# Patient Record
Sex: FEMALE | Race: WHITE | Employment: FULL TIME | URBAN - METROPOLITAN AREA
[De-identification: names, ages, dates, MRNs, and addresses within clinical notes are randomized per-mention and may not be internally consistent; named-entity substitution may affect disease eponyms.]

---

## 2017-11-24 LAB
ABO + RH BLD: NORMAL
ABO + RH BLD: NORMAL
BLD GP AB SCN SERPL QL: NORMAL
HBV SURFACE AG SERPL QL IA: NORMAL
HIV 1+2 AB+HIV1 P24 AG SERPL QL IA: NORMAL
RUBELLA ABY IGG: 4.16
RUBELLA ANTIBODY IGG QUANTITATIVE: NORMAL IU/ML
T PALLIDUM IGG SER QL: NORMAL

## 2018-02-09 ENCOUNTER — PRE VISIT (OUTPATIENT)
Dept: MATERNAL FETAL MEDICINE | Facility: CLINIC | Age: 33
End: 2018-02-09

## 2018-02-19 ENCOUNTER — OFFICE VISIT (OUTPATIENT)
Dept: MATERNAL FETAL MEDICINE | Facility: CLINIC | Age: 33
End: 2018-02-19
Attending: OBSTETRICS & GYNECOLOGY
Payer: COMMERCIAL

## 2018-02-19 ENCOUNTER — HOSPITAL ENCOUNTER (OUTPATIENT)
Dept: ULTRASOUND IMAGING | Facility: CLINIC | Age: 33
Discharge: HOME | End: 2018-02-19
Attending: OBSTETRICS & GYNECOLOGY | Admitting: OBSTETRICS & GYNECOLOGY
Payer: COMMERCIAL

## 2018-02-19 DIAGNOSIS — O09.812 PREGNANCY RESULTING FROM ASSISTED REPRODUCTIVE TECHNOLOGY IN SECOND TRIMESTER: Primary | ICD-10-CM

## 2018-02-19 DIAGNOSIS — O26.90 PREGNANCY RELATED CONDITION, ANTEPARTUM: ICD-10-CM

## 2018-02-19 DIAGNOSIS — O35.9XX0 SUSPECTED FETAL ANOMALY, ANTEPARTUM, SINGLE OR UNSPECIFIED FETUS: ICD-10-CM

## 2018-02-19 PROCEDURE — 93325 DOPPLER ECHO COLOR FLOW MAPG: CPT | Mod: 52

## 2018-02-19 NOTE — MR AVS SNAPSHOT
"              After Visit Summary   2/19/2018    Imelda Zepeda    MRN: [id number]           Patient Information     Date Of Birth          1985        Visit Information        Provider Department      2/19/2018 3:30 PM Karina Ward MD [facility] Maternal Fetal Medicine [facility]        Today's Diagnoses     Pregnancy resulting from assisted reproductive technology in second trimester    -  1    Suspected fetal anomaly, antepartum, single or unspecified fetus           Follow-ups after your visit        Future tests that were ordered for you today     Open Future Orders        Priority Expected Expires Ordered    MFM US Comprehensive Single Routine  12/19/2018 2/19/2018    Echo Fetal Complete-Peds Cardiology Routine  2/19/2019 2/19/2018    Maternal Fetal Unable to Finish Exam single fetus Routine  10/19/2018 12/19/2017            Who to contact     If you have questions or need follow up information about today's clinic visit or your schedule please contact [facility] MATERNAL FETAL MEDICINE [facility] directly at [phone].  Normal or non-critical lab and imaging results will be communicated to you by Primitive Makeuphart, letter or phone within 4 business days after the clinic has received the results. If you do not hear from us within 7 days, please contact the clinic through [employer] or phone. If you have a critical or abnormal lab result, we will notify you by phone as soon as possible.  Submit refill requests through eDreams Ed[employer]oft or call your pharmacy and they will forward the refill request to us. Please allow 3 business days for your refill to be completed.          Additional Information About Your Visit        [employer] Information     eDreams Ed[employer]oft lets you send messages to your doctor, view your test results, renew your prescriptions, schedule appointments and more. To sign up, go to [email or web address] . Click on \"Log in\" on the left side of the screen, which will take you to the Welcome page. Then click on " "\"Sign up Now\" on the right side of the page.     You will be asked to enter the access code listed below, as well as some personal information. Please follow the directions to create your username and password.     Your access code is: [email or web address]  Expires: 2018  9:24 PM     Your access code will  in 90 days. If you need help or a new code, please call your [place] clinic or [phone].        Care EveryWhere ID     This is your Care EveryWhere ID. This could be used by other organizations to access your [place] medical records  [id number]         Blood Pressure from Last 3 Encounters:   10/19/14 125/75    Weight from Last 3 Encounters:   No data found for Wt               Primary Care Provider Office Phone # Fax #    Leatha Parish DO Carmelina [phone]       [facility] [address] [place] [address]        Equal Access to Services     HANK RAMOS AH: Hadii aad ku hadasho Soomaali, waaxda luqadaha, qaybta kaalmada adeegyada, waxay idiin hayingrisn shira kharatelma mccann . So [facility] [phone].    ATENCIÓN: Si habla español, tiene a quevedo disposición servicios gratuitos de asistencia lingüística. Llame al [phone].    We comply with applicable federal civil rights laws and [place] laws. We do not discriminate on the basis of race, color, national origin, age, disability, sex, sexual orientation, or gender identity.            Thank you!     Thank you for choosing [facility]  for your care. Our goal is always to provide you with excellent care. Hearing back from our patients is one way we can continue to improve our services. Please take a few minutes to complete the written survey that you may receive in the mail after your visit with us. Thank you!             Your Updated Medication List - Protect others around you: Learn how to safely use, store and throw away your medicines at www.disposemymeds.org.          This list is accurate as of " 2/19/18  9:24 PM.  Always use your most recent med list.                   Brand Name Dispense Instructions for use Diagnosis    prenatal multivitamin plus iron 27-0.8 MG Tabs per tablet      Take 1 tablet by mouth daily

## 2018-02-23 DIAGNOSIS — O35.9XX0 SUSPECTED FETAL ANOMALY, ANTEPARTUM, SINGLE OR UNSPECIFIED FETUS: Primary | ICD-10-CM

## 2018-02-27 ENCOUNTER — OFFICE VISIT (OUTPATIENT)
Dept: MATERNAL FETAL MEDICINE | Facility: CLINIC | Age: 33
End: 2018-02-27
Attending: OBSTETRICS & GYNECOLOGY
Payer: COMMERCIAL

## 2018-02-27 ENCOUNTER — HOSPITAL ENCOUNTER (OUTPATIENT)
Dept: CARDIOLOGY | Facility: CLINIC | Age: 33
Discharge: HOME | End: 2018-02-27
Attending: OBSTETRICS & GYNECOLOGY | Admitting: OBSTETRICS & GYNECOLOGY
Payer: COMMERCIAL

## 2018-02-27 ENCOUNTER — HOSPITAL ENCOUNTER (OUTPATIENT)
Dept: ULTRASOUND IMAGING | Facility: CLINIC | Age: 33
End: 2018-02-27
Attending: OBSTETRICS & GYNECOLOGY
Payer: COMMERCIAL

## 2018-02-27 DIAGNOSIS — O35.9XX0 SUSPECTED FETAL ANOMALY, ANTEPARTUM, SINGLE OR UNSPECIFIED FETUS: ICD-10-CM

## 2018-02-27 DIAGNOSIS — O35.9XX0 SUSPECTED FETAL ANOMALY, ANTEPARTUM, SINGLE OR UNSPECIFIED FETUS: Primary | ICD-10-CM

## 2018-02-27 DIAGNOSIS — O35.BXX0 ANOMALY OF HEART OF FETUS AFFECTING PREGNANCY, ANTEPARTUM, SINGLE OR UNSPECIFIED FETUS: Primary | ICD-10-CM

## 2018-02-27 PROCEDURE — 76811 OB US DETAILED SNGL FETUS: CPT

## 2018-02-27 PROCEDURE — 76825 ECHO EXAM OF FETAL HEART: CPT

## 2018-02-27 PROCEDURE — 40000072 ZZH STATISTIC GENETIC COUNSELING, < 16 MIN: Mod: ZF | Performed by: GENETIC COUNSELOR, MS

## 2018-02-27 NOTE — MR AVS SNAPSHOT
After Visit Summary   2/27/2018    Imelda Zepeda    MRN: [id number]           Patient Information     Date Of Birth          1985        Visit Information        Provider Department      2/27/2018 3:30 PM Radha Calderon DO [facility] Maternal Fetal Medicine - [place]        Today's Diagnoses     Anomaly of heart of fetus affecting pregnancy, antepartum, single or unspecified fetus    -  1       Follow-ups after your visit        Your next 10 appointments already scheduled     Mar 09, 2018 10:15 AM CST   (Arrive by 10:00 AM)   MFM US COMPRE SINGLE F/U with [id number]   [facility] Maternal Fetal Medicine Ultrasound - [facility])    [address]  [facility] [address] [phone]           Wear comfortable clothes and leave your valuables at home.            Mar 09, 2018 10:45 AM CST   Radiology MD with  ISRRAEL MORALES   [facility] Maternal Fetal Medicine - [facility])    [address]  [facility] [address] [phone]           Please arrive at the time given for your first appointment. This visit is used internally to schedule the physician's time during your ultrasound.            Mar 16, 2018 10:15 AM CDT   (Arrive by 10:00 AM)   MFM US COMPRE SINGLE F/U with RHMFMUSR3   [facility] Maternal Fetal Medicine Ultrasound - [facility])    [address]  [facility] [id number] [phone]           Wear comfortable clothes and leave your valuables at home.            Mar 16, 2018 10:45 AM CDT   [facility]   [facility] Maternal Fetal Medicine [facility])    [address]  [facility] [address]   [phone]           Please arrive at the time given for your first appointment. This visit is used internally to schedule the physician's time during your ultrasound.            Mar 30, 2018  1:00 PM CDT   Ech Fetal Follow-Up* with  "URFETR1   [facility] Echo/EKG ([facility])    [address]  [place] [address]                 Future tests that were ordered for you today     Open Future Orders        Priority Expected Expires Ordered    [facility] Comprehensive Single F/U Routine 3/13/2018 2019 2018    MF US Comprehensive Single F/U Routine 3/27/2018 2019 2018    Echo fetal complete follow up Routine  2019            Who to contact     If you have questions or need follow up information about today's clinic visit or your schedule please contact [facility] MATERNAL FETAL MEDICINE [facility] directly at [phone].  Normal or non-critical lab and imaging results will be communicated to you by Cell Therapyhart, letter or phone within 4 business days after the clinic has received the results. If you do not hear from us within 7 days, please contact the clinic through Cell Therapyhart or phone. If you have a critical or abnormal lab result, we will notify you by phone as soon as possible.  Submit refill requests through [employer] or call your pharmacy and they will forward the refill request to us. Please allow 3 business days for your refill to be completed.          Additional Information About Your Visit        Cell [employer]" Information     [employer] lets you send messages to your doctor, view your test results, renew your prescriptions, schedule appointments and more. To sign up, go to [email or web address] . Click on \"Log in\" on the left side of the screen, which will take you to the Welcome page. Then click on \"Sign up Now\" on the right side of the page.     You will be asked to enter the access code listed below, as well as some personal information. Please follow the directions to create your username and password.     Your access code is: [email or web address]  Expires: 2018  9:24 PM     Your access code will  in 90 days. If you need help or a new code, please call your [place] clinic or " [phone].        Care EveryWhere ID     This is your Care EveryWhere ID. This could be used by other organizations to access your [place] medical records  [id number]         Blood Pressure from Last 3 Encounters:   10/19/14 125/75    Weight from Last 3 Encounters:   No data found for Wt               Primary Care Provider Office Phone # Fax #    Leatha Cosme [phone]       [facility] [address] [place] [address]        Equal Access to Services     HANK RAMOS : Hadii aad ku hadasho Soomaali, waaxda luqadaha, qaybta kaalmada adeegyada, waxay idiin hayaan adeeg kharatelma latatum . So [facility] [phone].    ATENCIÓN: Si habla español, tiene a quevedo disposición servicios gratuitos de asistencia lingüística. Llame al [phone].    We comply with applicable federal civil rights laws and [place] laws. We do not discriminate on the basis of race, color, national origin, age, disability, sex, sexual orientation, or gender identity.            Thank you!     Thank you for choosing [facility]  for your care. Our goal is always to provide you with excellent care. Hearing back from our patients is one way we can continue to improve our services. Please take a few minutes to complete the written survey that you may receive in the mail after your visit with us. Thank you!             Your Updated Medication List - Protect others around you: Learn how to safely use, store and throw away your medicines at www.disposemymeds.org.          This list is accurate as of 2/27/18  5:21 PM.  Always use your most recent med list.                   Brand Name Dispense Instructions for use Diagnosis    prenatal multivitamin plus iron 27-0.8 MG Tabs per tablet      Take 1 tablet by mouth daily

## 2018-02-27 NOTE — PROGRESS NOTES
[facility]  Genetic Counseling Consult    Patient: Imelda Zepeda YOB: 1985   Date of Service: 2/27/18      Imelda Zepeda was seen at [facility] [place] for genetic consultation to discuss the ultrasound finding observed on her fetal echocardiogram and comprehensive ultrasound.  Imelda was accompanied to her appointments today by her  Zach.  Our conversation today was kept brief as the couple reported feeling slightly overwhelmed and wanting time to digest the information they have already received.         Impression/Plan:   1.  Josiah was referred to [facility] initially for a fetal echocardiogram with an indication of IVF pregnancy.  This initial imaging study identified a cardiac lesion, and Imelda returned for care to day for a follow up echocardiogram and comprehensive ultrasound.  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  Please see corresponding documentation for full details.      2.  Imelda met with genetic counseling at the conclusion of her appointments today to discuss possible genetic causes for the benign mass, as well as available testing options.  We briefly discussed two specific conditions, Tuberous Sclerosis and Neurofibromatosis.      3.  Imelda's pregnancy was conceived via IVF with pre-implantation genetic screening, which greatly reduces the risks for her pregnancy to be affected with common chromosomes such as Down syndrome, Trisomy 18, and Trisomy 13, PGS can also screen for large scale additions and deletions of chromosomes, but it does not assess for single gene disorders such as tuberous sclerosis or neurofibromatosis.  Imelda has had no additional screening during her pregnancy.     4.  Imelda will return in 4 weeks for a repeat echocardiogram and comprehensive ultrasound, and genetic counseling remains available at any point in time for additional discussion.             Risk Assessment for Chromosome Conditions:     We explained that the risk for fetal chromosome abnormalities affecting Imelda's pregnancy is low, considering the preimplantation genetic screening that she has already had.  However, PGS is not definitive testing and does not rule out the possibility of her pregnancy having a chromosome abnormality.  Additional testing options such as NIPT or amniocentesis are available for additional information.         Discussion     Neurofibromatosis    Neurofibromatosis is a genetic condition that causes a wide range of skin findings, including cafe-au-lait spots, freckling in the armpits and groin, and tumors called neurofibromas.  About half of individuals with NF have learning disabilities.  Some individuals with NF can have neurofibromas develop in and around the heart.  The exact risk association between a cardiac fibroma detected prenatally and having neurofibromatosis is unclear.  Today we discussed that NF is an extremely variable condition, and that although all individuals who have a genetic mutation will have some symptoms, there are individuals who are never diagnosed because the symptoms are mild and never require medical management.  In 50% of cases, the genetic change arises as a new mutation in the affected individual, and is not inherited from either parent.  There are strict criteria for establishing a clinical diagnosis of NF, and this requires a thorough physical evaluation that cannot be completed prenatally.  We discussed that there is genetic testing for neurofibromatosis, with a detection rate in individuals who meet clinical diagnostic criteria of 95%.  This means that in individuals who are known to be affected with NF based on their presenting symptoms, genetic testing will find a mutation 95% of the time.      Tuberous Sclerosis Complex     Tuberous sclerosis complex is a genetic condition that impacts the skin, brain, heart, kidneys and lungs,  although how it presents varies from individual to individual, with some individuals being mildly affected enough that they are never diagnosed, and some individuals being much more severely affected.  In 66% of cases of TSC, the genetic cause arises and a new mutation in the affected individual, and is not inherited from either parent.  Rhabdomyomas are benign tumors that are commonly seen in individuals with TSC.  Approximately 75-80% of individuals with a rhabdomyoma present at birth are affected with TSC.  There are strict criteria for establishing a clinical diagnosis of TSC, and this requires a thorough physical evaluation that cannot be completed prenatally.  We discussed that there is genetic testing for tuberous sclerosis complex, with a detection rate in individuals who meet clinical diagnostic criteria of 75-90%.  This means that in individuals who are known to be affected with NF based on their presenting symptoms, genetic testing will find a mutation 75-90% of the time.      Our discussion today focused primarily on a basic overview of the two conditions that we would say the pregnancy is at increased risk for based on the ultrasound finding.  Because we are unable to definitively classify the lesion as a fibroma or a rhabdomyoma, there is a risk for the pregnancy to be affected with either of these conditions.  We discussed that the genetic testing that the couple had on their embryos as a part of the IVF process would not have looked for these conditions, unless there was a known risk and known genetic mutation in the family.    We discussed the utility of genetic testing via amniocentesis during pregnancy.  For both of these conditions, even when an individual meets clinical diagnostic criteria, genetic testing may not identify the genetic cause.  Clinical diagnoses of these conditions traditionally requires a physical examination because of the prevalence in skin findings in both conditions; this  means that a clinical diagnosis cannot be established prenatally, except with genetic testing that identifies a causative mutation.  A negative result on amniocentesis would not rule out either of these conditions, but it would make them less likely.  Further evaluation by a  familiar with NF and TSC would be recommended after delivery even in the result of negative genetic testing.  We discussed that for both of these conditions, not all symptoms may be present at birth, but that early evaluation would be a way to provide more definitive information.      At this time, Imelda and Zach were uncertain whether or not genetic testing via amniocentesis would be beneficial to them.  Amniocentesis is available at any point in time throughout pregnancy.  I encouraged the couple to take time to digest everything they had heard so far, and to write down questions as they come up.  Imelda and Zach were encouraged to contact myself or any of the other providers involved in their care with any questions or concerns, and a more extensive genetic counseling appointment could be arranged at their convenience as well.      It was a pleasure to be involved with Imelda s care. Face-to-face time of the meeting was 15 minutes.      Madi Hernandes MS, [facility]  Licensed Genetic Counselor  Phone: [phone]  Pager: [phone]

## 2018-02-27 NOTE — MR AVS SNAPSHOT
After Visit Summary   2/27/2018    Imelda Zepeda    MRN: [id number]           Patient Information     Date Of Birth          1985        Visit Information        Provider Department      2/27/2018 12:45 PM Madi Hernandes GC [facility] Maternal Fetal Medicine [facility]        Today's Diagnoses     Suspected fetal anomaly, antepartum, single or unspecified fetus    -  1       Follow-ups after your visit        Your next 10 appointments already scheduled     Mar 09, 2018 10:15 AM CST   (Arrive by 10:00 AM)   MFM US COMPRE SINGLE F/U with [id number]   [facility] Maternal Fetal Medicine Ultrasound - [facility])    [address]  [facility] [address] [phone]           Wear comfortable clothes and leave your valuables at home.            Mar 09, 2018 10:45 AM CST   Radiology MD with  ISRRAEL MORALES   [facility] Maternal Fetal Medicine [facility])    [address]  [facility] [address] [phone]           Please arrive at the time given for your first appointment. This visit is used internally to schedule the physician's time during your ultrasound.            Mar 16, 2018 10:15 AM CDT   (Arrive by 10:00 AM)   MFM US COMPRE SINGLE F/U with [id number]   [facility] Maternal Fetal Medicine Ultrasound - [facility])    [address]  [facility] [id number] [phone]           Wear comfortable clothes and leave your valuables at home.            Mar 16, 2018 10:45 AM CDT   Radiology MD with [facility]   [facility] Maternal Fetal Medicine [facility])    [address]  [facility] [address]   [phone]           Please arrive at the time given for your first appointment. This visit is used internally to schedule the physician's time during your ultrasound.            Mar 30, 2018  1:00 PM CDT   Ech Fetal Follow-Up* with URFETR1   [facility]  [facility]cho[facility] ([facility])    [address]  [place] [address]                 Future tests that were ordered for you today     Open Future Orders        Priority Expected Expires Ordered    [facility] Comprehensive Single F/U Routine 3/13/2018 2019 2018    MF US Comprehensive Single F/U Routine 3/27/2018 2019 2018    Echo fetal complete follow up Routine  2019            Who to contact     If you have questions or need follow up information about today's clinic visit or your schedule please contact [facility] MATERNAL FETAL MEDICINE [facility] directly at [phone].  Normal or non-critical lab and imaging results will be communicated to you by FlyCasthart, letter or phone within 4 business days after the clinic has received the results. If you do not hear from us within 7 days, please contact the clinic through [employer]Cas[employer] or phone. If you have a critical or abnormal lab result, we will notify you by phone as soon as possible.  Submit refill requests through [employer] or call your pharmacy and they will forward the refill request to us. Please allow 3 business days for your refill to be completed.          Additional Information About Your Visit        [employer] Information     [employer]R lets you send messages to your doctor, view your test results, renew your prescriptions, schedule appointments and more. To sign up, go to [email or web address] . Click on \"Log in\" on the left side of the screen, which will take you to the Welcome page. Then click on \"Sign up Now\" on the right side of the page.     You will be asked to enter the access code listed below, as well as some personal information. Please follow the directions to create your username and password.     Your access code is: [email or web address]  Expires: 2018  9:24 PM     Your access code will  in 90 days. If you need help or a new code, please call your [place] clinic or [phone].        Care " EveryWhere ID     This is your Care EveryWhere ID. This could be used by other organizations to access your [place] medical records  [id number]         Blood Pressure from Last 3 Encounters:   10/19/14 125/75    Weight from Last 3 Encounters:   No data found for Wt              We Performed the Following     [facility] Genetic Counseling        Primary Care Provider Office Phone # Fax #    Leatha Cosme [phone]       [facility] [address] [place] [address]        Equal Access to Services     HANK RAMOS : Hadii aad ku hadasho Soomaali, waaxda luqadaha, qaybta kaalmada adeegyada, waxay idiin hayaan adeeg kharash latatum . So [facility] [phone].    ATENCIÓN: Si habla español, tiene a quevedo disposición servicios gratuitos de asistencia lingüística. Llame al [phone].    We comply with applicable federal civil rights laws and [place] laws. We do not discriminate on the basis of race, color, national origin, age, disability, sex, sexual orientation, or gender identity.            Thank you!     Thank you for choosing [facility]  for your care. Our goal is always to provide you with excellent care. Hearing back from our patients is one way we can continue to improve our services. Please take a few minutes to complete the written survey that you may receive in the mail after your visit with us. Thank you!             Your Updated Medication List - Protect others around you: Learn how to safely use, store and throw away your medicines at www.disposemymeds.org.          This list is accurate as of 2/27/18  5:30 PM.  Always use your most recent med list.                   Brand Name Dispense Instructions for use Diagnosis    prenatal multivitamin plus iron 27-0.8 MG Tabs per tablet      Take 1 tablet by mouth daily

## 2018-02-27 NOTE — PROGRESS NOTES
"Please see \"Imaging\" tab under \"Chart Review\" for details of today's US.      Radha Calderon, DO  Maternal-Fetal Medicine        "

## 2018-03-09 ENCOUNTER — OFFICE VISIT (OUTPATIENT)
Dept: MATERNAL FETAL MEDICINE | Facility: CLINIC | Age: 33
End: 2018-03-09
Attending: OBSTETRICS & GYNECOLOGY
Payer: COMMERCIAL

## 2018-03-09 ENCOUNTER — HOSPITAL ENCOUNTER (OUTPATIENT)
Dept: ULTRASOUND IMAGING | Facility: CLINIC | Age: 33
Discharge: HOME | End: 2018-03-09
Attending: OBSTETRICS & GYNECOLOGY | Admitting: OBSTETRICS & GYNECOLOGY
Payer: COMMERCIAL

## 2018-03-09 DIAGNOSIS — O35.BXX0 ANOMALY OF HEART OF FETUS AFFECTING PREGNANCY, ANTEPARTUM, SINGLE OR UNSPECIFIED FETUS: ICD-10-CM

## 2018-03-09 DIAGNOSIS — O35.BXX0 ANOMALY OF HEART OF FETUS AFFECTING PREGNANCY, ANTEPARTUM, SINGLE OR UNSPECIFIED FETUS: Primary | ICD-10-CM

## 2018-03-09 PROCEDURE — 76816 OB US FOLLOW-UP PER FETUS: CPT

## 2018-03-09 NOTE — MR AVS SNAPSHOT
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            Mar 16, 2018 10:45 AM CDT   Radiology MD with  ISRRAEL MORALES   [facility] Maternal Fetal Medicine [facility] ([facility])    [address]  [facility] [address] [phone]           Please arrive at the time given for your first appointment. This visit is used internally to schedule the physician's time during your ultrasound.            Mar 30, 2018  1:00 PM CDT   Ech Fetal Follow-Up* with URFETR1   [facility] Echo/EKG ([facility])    [address] [facility] [address]                 Who to contact     If you have questions or need follow up information about today's clinic visit or your schedule please contact [facility] MATERNAL FETAL [facility] ([place], [place]) directly at [phone].  Normal or non-critical lab and imaging results will be communicated to you by MyChart, letter or phone within 4 business days after the clinic has received the results. If you do not hear from us within 7 days, please contact the clinic through MyChart or phone. If you have a critical or abnormal lab result, we will notify you by phone as soon as possible.  Submit refill  "requests through [facility] or call your pharmacy and they will forward the refill request to us. Please allow 3 business days for your refill to be completed.          Additional Information About Your Visit        [employer] Information     [facility] lets you send messages to your doctor, view your test results, renew your prescriptions, schedule appointments and more. To sign up, go to [email or web address] . Click on \"Log in\" on the left side of the screen, which will take you to the Welcome page. Then click on \"Sign up Now\" on the right side of the page.     You will be asked to enter the access code listed below, as well as some personal information. Please follow the directions to create your username and password.     Your access code is: [email or web address]  Expires: 2018 10:24 PM     Your access code will  in 90 days. If you need help or a new code, please call your [place] clinic or [phone].        Care EveryWhere ID     This is your Care EveryWhere ID. This could be used by other organizations to access your [place] medical records  [id number]         Blood Pressure from Last 3 Encounters:   10/19/14 125/75    Weight from Last 3 Encounters:   No data found for Wt              Today, you had the following     No orders found for display       Primary Care Provider Office Phone # Fax #    Leatha Cosme [phone]       [facility] [address] [place] [address]        Equal Access to Services     JUDITH RAMOS : Hadii aad ku hadasho Soomaali, waaxda luqadaha, qaybta kaalmada adeegyada, lacy alvarado adeshekhar mccann . So [facility] [phone].    ATENCIÓN: Si habla español, tiene a quevedo disposición servicios gratuitos de asistencia lingüística. Carmen al [phone].    We comply with applicable federal civil rights laws and [place] laws. We do not discriminate on the basis of race, color, national origin, age, disability, sex, sexual orientation, or " gender identity.            Thank you!     Thank you for choosing [facility]  for your care. Our goal is always to provide you with excellent care. Hearing back from our patients is one way we can continue to improve our services. Please take a few minutes to complete the written survey that you may receive in the mail after your visit with us. Thank you!             Your Updated Medication List - Protect others around you: Learn how to safely use, store and throw away your medicines at www.disposemymeds.org.          This list is accurate as of 3/9/18 11:59 PM.  Always use your most recent med list.                   Brand Name Dispense Instructions for use Diagnosis    prenatal multivitamin plus iron 27-0.8 MG Tabs per tablet      Take 1 tablet by mouth daily

## 2018-03-16 ENCOUNTER — HOSPITAL ENCOUNTER (OUTPATIENT)
Dept: ULTRASOUND IMAGING | Facility: CLINIC | Age: 33
Discharge: HOME | End: 2018-03-16
Attending: OBSTETRICS & GYNECOLOGY | Admitting: OBSTETRICS & GYNECOLOGY
Payer: COMMERCIAL

## 2018-03-16 ENCOUNTER — OFFICE VISIT (OUTPATIENT)
Dept: MATERNAL FETAL MEDICINE | Facility: CLINIC | Age: 33
End: 2018-03-16
Attending: OBSTETRICS & GYNECOLOGY
Payer: COMMERCIAL

## 2018-03-16 DIAGNOSIS — O35.BXX0 ANOMALY OF HEART OF FETUS AFFECTING PREGNANCY, ANTEPARTUM, SINGLE OR UNSPECIFIED FETUS: ICD-10-CM

## 2018-03-16 DIAGNOSIS — O35.BXX0 ANOMALY OF HEART OF FETUS AFFECTING PREGNANCY, ANTEPARTUM, SINGLE OR UNSPECIFIED FETUS: Primary | ICD-10-CM

## 2018-03-16 PROCEDURE — 76816 OB US FOLLOW-UP PER FETUS: CPT

## 2018-03-16 NOTE — PROGRESS NOTES
"Please see \"Imaging\" tab under \"Chart Review\" for details of today's US at the [facility].    Roger Del Valle MD  Maternal-Fetal Medicine    "

## 2018-03-16 NOTE — MR AVS SNAPSHOT
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            Mar 30, 2018 12:15 PM CDT   Radiology MD with UR ISRRAEL MORALES   [facility] Maternal Fetal Medicine - [facility])    [address]  [facility] [address] [phone]           Please arrive at the time given for your first appointment. This visit is used internally to schedule the physician's time during your ultrasound.            Mar 30, 2018  1:00 PM CDT   Ech Fetal Follow-Up* with URFETR1   [facility] Echo[facility]EKG ([facility])    [address] [place] [address]  [facility] [address]                 Future tests that were ordered for you today     Open Future Orders        Priority Expected Expires Ordered    [facility] US Comprehensive Single F/U Routine 3/30/2018 3/16/2019 3/16/2018            Who to contact     If you have questions or need follow up information about today's clinic visit or your schedule please contact [facility] MATERNAL FETAL MEDICINE [facility] directly at [phone].  Normal or non-critical lab and imaging results will be communicated to you by MyChart, letter or phone within 4 business days after the clinic has  "received the results. If you do not hear from us within 7 days, please contact the clinic through [employer] or phone. If you have a critical or abnormal lab result, we will notify you by phone as soon as possible.  Submit refill requests through [employer] or call your pharmacy and they will forward the refill request to us. Please allow 3 business days for your refill to be completed.          Additional Information About Your Visit        [employer] Information     [employer] lets you send messages to your doctor, view your test results, renew your prescriptions, schedule appointments and more. To sign up, go to [email or web address] . Click on \"Log in\" on the left side of the screen, which will take you to the Welcome page. Then click on \"Sign up Now\" on the right side of the page.     You will be asked to enter the access code listed below, as well as some personal information. Please follow the directions to create your username and password.     Your access code is: [email or web address]  Expires: 2018 10:24 PM     Your access code will  in 90 days. If you need help or a new code, please call your [place] clinic or [phone].        Care EveryWhere ID     This is your Care EveryWhere ID. This could be used by other organizations to access your [place] medical records  [id number]         Blood Pressure from Last 3 Encounters:   10/19/14 125/75    Weight from Last 3 Encounters:   No data found for                Primary Care Provider Office Phone # Fax #    Leatha Марина Cosme [phone]       [facility] [address] [place] [address]        Equal Access to Services     [facility]: Hadii aad ku hadasho Soomaali, waaxda luqadaha, qaybta kaalmada damien, lacy javed. So [facility] [phone].    ATENCIÓN: Si habla español, tiene a quevedo disposición servicios gratuitos de asistencia lingüística. Llame al [phone].    We comply with applicable " federal civil rights laws and [place] laws. We do not discriminate on the basis of race, color, national origin, age, disability, sex, sexual orientation, or gender identity.            Thank you!     Thank you for choosing [facility]  for your care. Our goal is always to provide you with excellent care. Hearing back from our patients is one way we can continue to improve our services. Please take a few minutes to complete the written survey that you may receive in the mail after your visit with us. Thank you!             Your Updated Medication List - Protect others around you: Learn how to safely use, store and throw away your medicines at www.disposemymeds.org.          This list is accurate as of 3/16/18 10:58 AM.  Always use your most recent med list.                   Brand Name Dispense Instructions for use Diagnosis    prenatal multivitamin plus iron 27-0.8 MG Tabs per tablet      Take 1 tablet by mouth daily

## 2018-03-30 ENCOUNTER — OFFICE VISIT (OUTPATIENT)
Dept: MATERNAL FETAL MEDICINE | Facility: CLINIC | Age: 33
End: 2018-03-30
Attending: OBSTETRICS & GYNECOLOGY
Payer: COMMERCIAL

## 2018-03-30 ENCOUNTER — HOSPITAL ENCOUNTER (OUTPATIENT)
Dept: CARDIOLOGY | Facility: CLINIC | Age: 33
Discharge: HOME | End: 2018-03-30
Attending: PEDIATRICS | Admitting: OBSTETRICS & GYNECOLOGY
Payer: COMMERCIAL

## 2018-03-30 ENCOUNTER — HOSPITAL ENCOUNTER (OUTPATIENT)
Dept: ULTRASOUND IMAGING | Facility: CLINIC | Age: 33
End: 2018-03-30
Attending: OBSTETRICS & GYNECOLOGY
Payer: COMMERCIAL

## 2018-03-30 DIAGNOSIS — O35.BXX0 ANOMALY OF HEART OF FETUS AFFECTING PREGNANCY, ANTEPARTUM, SINGLE OR UNSPECIFIED FETUS: ICD-10-CM

## 2018-03-30 DIAGNOSIS — D21.9 RHABDOMYOMA: ICD-10-CM

## 2018-03-30 DIAGNOSIS — R93.1 ABNORMAL FINDINGS ON DIAGNOSTIC IMAGING OF HEART AND CORONARY CIRCULATION: Primary | ICD-10-CM

## 2018-03-30 PROCEDURE — 76816 OB US FOLLOW-UP PER FETUS: CPT

## 2018-03-30 PROCEDURE — 93325 DOPPLER ECHO COLOR FLOW MAPG: CPT

## 2018-03-30 NOTE — MR AVS SNAPSHOT
"              After Visit Summary   3/30/2018    Imelda Zepeda    MRN: [id number]           Patient Information     Date Of Birth          1985        Visit Information        Provider Department      3/30/2018 12:15 PM Radha Calderon, DO [facility] Maternal Fetal Medicine [facility]        Today's Diagnoses     Abnormal findings on diagnostic imaging of heart and coronary circulation    -  1       Follow-ups after your visit        Future tests that were ordered for you today     Open Future Orders        Priority Expected Expires Ordered    [facility] Comprehensive Single F/U Routine 4/13/2018 3/30/2019 3/30/2018    [facility] Comprehensive Single F/U Routine  3/30/2019 3/30/2018    [facility] Comprehensive Single F/U Routine  3/30/2019 3/30/2018            Who to contact     If you have questions or need follow up information about today's clinic visit or your schedule please contact [facility] MATERNAL FETAL MEDICINE [facility] directly at [phone].  Normal or non-critical lab and imaging results will be communicated to you by [employer], letter or phone within 4 business days after the clinic has received the results. If you do not hear from us within 7 days, please contact the clinic through [employer] or phone. If you have a critical or abnormal lab result, we will notify you by phone as soon as possible.  Submit refill requests through [employer] or call your pharmacy and they will forward the refill request to us. Please allow 3 business days for your refill to be completed.          Additional Information About Your Visit        [employer] Information     [employer] lets you send messages to your doctor, view your test results, renew your prescriptions, schedule appointments and more. To sign up, go to [email or web address] . Click on \"Log in\" on the left side of the screen, which will take you to the Welcome page. Then click on \"Sign up Now\" on the right side of the page.     You will be asked to enter the access code " listed below, as well as some personal information. Please follow the directions to create your username and password.     Your access code is: [email or web address]  Expires: 2018 10:24 PM     Your access code will  in 90 days. If you need help or a new code, please call your [place] clinic or [phone].        Care EveryWhere ID     This is your Care EveryWhere ID. This could be used by other organizations to access your [place] medical records  [id number]         Blood Pressure from Last 3 Encounters:   10/19/14 125/75    Weight from Last 3 Encounters:   No data found for Wt               Primary Care Provider Office Phone # Fax #    Leatha Parish DO [place] [phone]       [facility] [address] [place] [address]        Equal Access to Services     [facility] : Hadii aad ku hadasho Soomaali, waaxda luqadaha, qaybta kaalmada adeegyada, lacy mccann . So [facility] [phone].    ATENCIÓN: Si habla español, tiene a quevedo disposición servicios gratuitos de asistencia lingüística. Carmen al [phone].    We comply with applicable federal civil rights laws and [place] laws. We do not discriminate on the basis of race, color, national origin, age, disability, sex, sexual orientation, or gender identity.            Thank you!     Thank you for choosing [facility]  for your care. Our goal is always to provide you with excellent care. Hearing back from our patients is one way we can continue to improve our services. Please take a few minutes to complete the written survey that you may receive in the mail after your visit with us. Thank you!             Your Updated Medication List - Protect others around you: Learn how to safely use, store and throw away your medicines at www.disposemymeds.org.          This list is accurate as of 3/30/18  1:00 PM.  Always use your most recent med list.                   Brand Name  Dispense Instructions for use Diagnosis    prenatal multivitamin plus iron 27-0.8 MG Tabs per tablet      Take 1 tablet by mouth daily

## 2018-04-05 DIAGNOSIS — O35.9XX0 SUSPECTED FETAL ANOMALY, ANTEPARTUM: Primary | ICD-10-CM

## 2018-04-10 ENCOUNTER — HOSPITAL ENCOUNTER (OUTPATIENT)
Dept: ULTRASOUND IMAGING | Facility: CLINIC | Age: 33
Discharge: HOME | End: 2018-04-10
Attending: OBSTETRICS & GYNECOLOGY | Admitting: OBSTETRICS & GYNECOLOGY
Payer: COMMERCIAL

## 2018-04-10 ENCOUNTER — OFFICE VISIT (OUTPATIENT)
Dept: MATERNAL FETAL MEDICINE | Facility: CLINIC | Age: 33
End: 2018-04-10
Attending: OBSTETRICS & GYNECOLOGY
Payer: COMMERCIAL

## 2018-04-10 DIAGNOSIS — O35.9XX0 FETAL ABNORMALITY AFFECTING MANAGEMENT OF MOTHER, ANTEPARTUM, SINGLE OR UNSPECIFIED FETUS: Primary | ICD-10-CM

## 2018-04-10 DIAGNOSIS — R93.1 ABNORMAL FINDINGS ON DIAGNOSTIC IMAGING OF HEART AND CORONARY CIRCULATION: ICD-10-CM

## 2018-04-10 PROCEDURE — 76816 OB US FOLLOW-UP PER FETUS: CPT

## 2018-04-10 NOTE — MR AVS SNAPSHOT
After Visit Summary   4/10/2018    Imelda Zepeda    MRN: [id number]           Patient Information     Date Of Birth          1985        Visit Information        Provider Department      4/10/2018 9:15 AM Lizzy Ervin MD [facility] Maternal Fetal Medicine - [facility]        Today's Diagnoses     Fetal abnormality affecting management of mother, antepartum, single or unspecified fetus    -  1       Follow-ups after your visit        Your next 10 appointments already scheduled     Apr 27, 2018 11:00 AM CDT   (Arrive by 10:45 AM)   MFM US COMPRE SINGLE F/U with RHMFMUSR2   [facility] Maternal Fetal Medicine Ultrasound - [facility])    [address]  [facility] [address] [phone]           Wear comfortable clothes and leave your valuables at home.            Apr 27, 2018 11:30 AM CDT   Radiology MD with  ISRRAEL MORALES   [facility] Maternal Fetal Medicine [facility])    [address]  [facility] [address] [phone]           Please arrive at the time given for your first appointment. This visit is used internally to schedule the physician's time during your ultrasound.            May 11, 2018 11:45 AM CDT   (Arrive by 11:30 AM)   MFM US COMPRE SINGLE F/U with [id number]   [facility] Maternal Fetal Medicine Ultrasound - [place] ([facility])    [address]  [facility] [address]   [phone]           Wear comfortable clothes and leave your valuables at home.            May 11, 2018 12:15 PM CDT   Radiology MD with UR ISRRAEL MORALES   [facility] Maternal Fetal Medicine - [facility])    [address]  [facility] [address]   [phone]           Please arrive at the time given for your first appointment. This visit is used internally to schedule the physician's time during your ultrasound.            May 11, 2018  1:00 PM  "CDT   Ech Fetal Follow-Up* with URFETR1   [facility] Echo/EKG ([facility])    [address]  [facility] [place] [address]                 Who to contact     If you have questions or need follow up information about today's clinic visit or your schedule please contact [facility] MATERNAL FETAL MEDICINE [facility] directly at [phone].  Normal or non-critical lab and imaging results will be communicated to you by [employer], letter or phone within 4 business days after the clinic has received the results. If you do not hear from us within 7 days, please contact the clinic through [employer] or phone. If you have a critical or abnormal lab result, we will notify you by phone as soon as possible.  Submit refill requests through [employer] or call your pharmacy and they will forward the refill request to us. Please allow 3 business days for your refill to be completed.          Additional Information About Your Visit        [employer] Information     [employer] lets you send messages to your doctor, view your test results, renew your prescriptions, schedule appointments and more. To sign up, go to [email or web address] . Click on \"Log in\" on the left side of the screen, which will take you to the Welcome page. Then click on \"Sign up Now\" on the right side of the page.     You will be asked to enter the access code listed below, as well as some personal information. Please follow the directions to create your username and password.     Your access code is: [email or web address]  Expires: 2018 10:24 PM     Your access code will  in 90 days. If you need help or a new code, please call your [place] clinic or [phone].        Care EveryWhere ID     This is your Care EveryWhere ID. This could be used by other organizations to access your [place] medical records  [id number]         Blood Pressure from Last 3 Encounters:   10/19/14 125/75    Weight from Last 3 Encounters:   No data found for Wt            "   Today, you had the following     No orders found for display       Primary Care Provider Office Phone # Fax #    Leatha Cosme [phone]       [facility] [address] [place] [address]        Equal Access to Services     HANK RAMOS : Hadii aad ku hadasho Soomaali, waaxda luqadaha, qaybta kaalmada adeegyada, waxay idiin hayaan adeeg kharash latatum javed. So [facility] [phone].    ATENCIÓN: Si habla español, tiene a quevedo disposición servicios gratuitos de asistencia lingüística. Llame al [phone].    We comply with applicable federal civil rights laws and [place] laws. We do not discriminate on the basis of race, color, national origin, age, disability, sex, sexual orientation, or gender identity.            Thank you!     Thank you for choosing [facility]  for your care. Our goal is always to provide you with excellent care. Hearing back from our patients is one way we can continue to improve our services. Please take a few minutes to complete the written survey that you may receive in the mail after your visit with us. Thank you!             Your Updated Medication List - Protect others around you: Learn how to safely use, store and throw away your medicines at www.disposemymeds.org.          This list is accurate as of 4/10/18  9:42 AM.  Always use your most recent med list.                   Brand Name Dispense Instructions for use Diagnosis    prenatal multivitamin plus iron 27-0.8 MG Tabs per tablet      Take 1 tablet by mouth daily

## 2018-04-10 NOTE — PROGRESS NOTES
Please see full imaging report from [facility] program under imaging tab.    Stable rhabdomyoma. Continue US every two weeks.     Lizzy Ervin MD  Maternal Fetal Medicine

## 2018-04-27 ENCOUNTER — HOSPITAL ENCOUNTER (OUTPATIENT)
Dept: ULTRASOUND IMAGING | Facility: CLINIC | Age: 33
Discharge: HOME | End: 2018-04-27
Attending: OBSTETRICS & GYNECOLOGY | Admitting: OBSTETRICS & GYNECOLOGY
Payer: COMMERCIAL

## 2018-04-27 ENCOUNTER — OFFICE VISIT (OUTPATIENT)
Dept: MATERNAL FETAL MEDICINE | Facility: CLINIC | Age: 33
End: 2018-04-27
Attending: OBSTETRICS & GYNECOLOGY
Payer: COMMERCIAL

## 2018-04-27 DIAGNOSIS — R93.1 ABNORMAL FINDINGS ON DIAGNOSTIC IMAGING OF HEART AND CORONARY CIRCULATION: ICD-10-CM

## 2018-04-27 DIAGNOSIS — O35.9XX0 FETAL ABNORMALITY AFFECTING MANAGEMENT OF MOTHER, ANTEPARTUM, SINGLE OR UNSPECIFIED FETUS: Primary | ICD-10-CM

## 2018-04-27 PROCEDURE — 76816 OB US FOLLOW-UP PER FETUS: CPT

## 2018-04-27 NOTE — MR AVS SNAPSHOT
After Visit Summary   4/27/2018    Imelda Zepeda    MRN: [id number]           Patient Information     Date Of Birth          1985        Visit Information        Provider Department      4/27/2018 11:30 AM Radha Calderon, DO [facility] Maternal Fetal Medicine [facility]        Today's Diagnoses     Fetal abnormality affecting management of mother, antepartum, single or unspecified fetus    -  1       Follow-ups after your visit        Your next 10 appointments already scheduled     May 11, 2018 11:45 AM CDT   (Arrive by 11:30 AM)   [facility] US COMPRE SINGLE F/U with URMFMUSR3   [facility] Maternal Fetal Medicine Ultrasound - [facility])    [address]  [facility] [address] [phone]           Wear comfortable clothes and leave your valuables at home.            May 11, 2018 12:15 PM CDT   Radiology MD with UR ISRRAEL MORALES   [facility] Maternal Fetal Medicine - [facility])    [address]  [facility] [address] [phone]           Please arrive at the time given for your first appointment. This visit is used internally to schedule the physician's time during your ultrasound.            May 11, 2018  1:00 PM CDT   Ech Fetal Follow-Up* with URFETR1   [facility] Echo/EKG ([facility])    [address]  [facility] [address]                 Future tests that were ordered for you today     Open Future Orders        Priority Expected Expires Ordered    [facility] US Comprehensive Single F/U Routine 5/25/2018 4/27/2019 4/27/2018    [facility] US Comprehensive Single F/U Routine 5/11/2018 4/27/2019 4/27/2018            Who to contact     If you have questions or need follow up information about today's clinic visit or your schedule please contact [facility] MATERNAL FETAL MEDICINE [facility] directly at [phone].  Normal or non-critical lab and imaging results will be communicated to you  "by MyChart, letter or phone within 4 business days after the clinic has received the results. If you do not hear from us within 7 days, please contact the clinic through [email or web address] or phone. If you have a critical or abnormal lab result, we will notify you by phone as soon as possible.  Submit refill requests through NanoSight or call your pharmacy and they will forward the refill request to us. Please allow 3 business days for your refill to be completed.          Additional Information About Your Visit        NanoSight Information     NanoSight lets you send messages to your doctor, view your test results, renew your prescriptions, schedule appointments and more. To sign up, go to [email or web address] . Click on \"Log in\" on the left side of the screen, which will take you to the Welcome page. Then click on \"Sign up Now\" on the right side of the page.     You will be asked to enter the access code listed below, as well as some personal information. Please follow the directions to create your username and password.     Your access code is: [email or web address]  Expires: 2018 10:24 PM     Your access code will  in 90 days. If you need help or a new code, please call your [place] clinic or [phone].        Care EveryWhere ID     This is your Care EveryWhere ID. This could be used by other organizations to access your [place] medical records  [id number]         Blood Pressure from Last 3 Encounters:   10/19/14 125/75    Weight from Last 3 Encounters:   No data found for                Primary Care Provider Office Phone # Fax #    Leatha Марина Cosme [phone]       [facility] [address] [place] [address]        Equal Access to Services     [facility] : Hadii mckenzie boyd Soenrique, waaxda luqadaha, qaybta kaalmada damien, lacy javed. So [facility] [phone].    ATENCIÓN: Si habla español, tiene a quevedo disposición servicios gratuitos de " asistencia lingüística. Carmen al [phone].    We comply with applicable federal civil rights laws and [place] laws. We do not discriminate on the basis of race, color, national origin, age, disability, sex, sexual orientation, or gender identity.            Thank you!     Thank you for choosing [facility]  for your care. Our goal is always to provide you with excellent care. Hearing back from our patients is one way we can continue to improve our services. Please take a few minutes to complete the written survey that you may receive in the mail after your visit with us. Thank you!             Your Updated Medication List - Protect others around you: Learn how to safely use, store and throw away your medicines at www.disposemymeds.org.          This list is accurate as of 4/27/18 11:50 AM.  Always use your most recent med list.                   Brand Name Dispense Instructions for use Diagnosis    prenatal multivitamin plus iron 27-0.8 MG Tabs per tablet      Take 1 tablet by mouth daily

## 2018-05-11 ENCOUNTER — OFFICE VISIT (OUTPATIENT)
Dept: MATERNAL FETAL MEDICINE | Facility: CLINIC | Age: 33
End: 2018-05-11
Attending: OBSTETRICS & GYNECOLOGY
Payer: COMMERCIAL

## 2018-05-11 ENCOUNTER — HOSPITAL ENCOUNTER (OUTPATIENT)
Dept: ULTRASOUND IMAGING | Facility: CLINIC | Age: 33
Discharge: HOME | End: 2018-05-11
Attending: OBSTETRICS & GYNECOLOGY | Admitting: OBSTETRICS & GYNECOLOGY
Payer: COMMERCIAL

## 2018-05-11 ENCOUNTER — HOSPITAL ENCOUNTER (OUTPATIENT)
Dept: CARDIOLOGY | Facility: CLINIC | Age: 33
End: 2018-05-11
Attending: PEDIATRICS
Payer: COMMERCIAL

## 2018-05-11 DIAGNOSIS — O35.9XX0 SUSPECTED FETAL ANOMALY, ANTEPARTUM: ICD-10-CM

## 2018-05-11 DIAGNOSIS — O35.9XX0 FETAL ABNORMALITY AFFECTING MANAGEMENT OF MOTHER, SINGLE OR UNSPECIFIED FETUS: Primary | ICD-10-CM

## 2018-05-11 DIAGNOSIS — R93.1 ABNORMAL FINDINGS ON DIAGNOSTIC IMAGING OF HEART AND CORONARY CIRCULATION: ICD-10-CM

## 2018-05-11 PROCEDURE — 76816 OB US FOLLOW-UP PER FETUS: CPT

## 2018-05-11 PROCEDURE — 93325 DOPPLER ECHO COLOR FLOW MAPG: CPT

## 2018-05-11 NOTE — CONSULTS
Fetal Cardiology Consultation    Patient:  Imelda Zepeda MRN:  [id number]   YOB: 1985 Age:  33 year old   Date of Visit:  2018 PCP:  Radha Rivera MD   KRISTAN: 2018, by Ultrasound EGA: 34w1d weeks     Dear Dr. Ervin:    I had the pleasure of seeing Imelda Zepeda at the [facility] Fetal Echocardiography Laboratory in [place] on 2018 in ongoing consultation for fetal echocardiography results. She presented today accompanied by her partner. 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    There is a (6.7-7.3)mm x 4.9mm x 3.6mm echobright, ovoid homogeneous mass in the left ventricle along the posterior apical interventricular septum. 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 The mass does not appear to involve any mitral valve support structures. Unobstructed mitral inflow profile. Unobstructed aortic outflow. Remainder of cardiac anatomy is normal. Normal right and left ventricular size and function. No effusion. No arrhythmia seen.    I reviewed and interpreted the fetal echocardiogram today. I discussed the results with Ms. Zepeda and her partner. I discussed results of the study and visit with you as well.  -- No additional fetal echocardiograms are recommended for this pregnancy.  -- A post-dallin transthoracic echocardiogram is recommended to reevaluate the cardiac mass.  -- A post- 12-lead ECG is recommended.  -- Genetic evaluation for possible tuberous sclerosis is recommended  -- After delivery, barring new cardiac concerns, the baby should follow-up with outpatient pediatric cardiology in [place] within several weeks.    Thank you for allowing me to participate in Ms. Zepeda's care. Please don't hesitate to contact me or the Fetal Cardiology team at [facility] with any questions or concerns.     I spent a total of 25 minutes face-to-face with Ms. Zepeda during  today's office visit. Over 50% of this time was spent counseling the patient and/or coordinating care regarding the fetal echocardiography results.     Gavin Worthington  Pediatric Cardiology  [facility]  Phone [phone]

## 2018-05-11 NOTE — PROGRESS NOTES
Please see full imaging report from ViewPoint program under imaging tab.        Lizzy Ervin MD  Maternal Fetal Medicine

## 2018-05-11 NOTE — MR AVS SNAPSHOT
After Visit Summary   5/11/2018    Imelda Zepeda    MRN: [id number]           Patient Information     Date Of Birth          1985        Visit Information        Provider Department      5/11/2018 12:15 PM Lizzy Ervin MD [facility] Maternal Fetal Medicine [facility]        Today's Diagnoses     Fetal abnormality affecting management of mother, single or unspecified fetus    -  1       Follow-ups after your visit        Your next 10 appointments already scheduled     May 11, 2018 12:15 PM CDT   Radiology MD with Lizzy Ervin MD   [facility] Maternal Fetal Medicine [facility] ([facility])    [address]  [facility] [address]   [phone]           Please arrive at the time given for your first appointment. This visit is used internally to schedule the physician's time during your ultrasound.            May 11, 2018  1:00 PM CDT   Ech Fetal Follow-Up* with URFETR1   [facility] Echo/EKG ([facility])    [address]  [facility] [place] [address]               May 25, 2018  8:00 AM CDT   MFM US COMPRE SINGLE F/U with RHMFMUSR3   [facility] Maternal Fetal Medicine Ultrasound - [facility])    [address]  [facility] [address] [phone]           Wear comfortable clothes and leave your valuables at home.            May 25, 2018  8:30 AM CDT   Radiology MD with  MFERI MORALES   [facility] Maternal Fetal Medicine - [facility] ([facility])    [address]  [facility] [address] [phone]           Please arrive at the time given for your first appointment. This visit is used internally to schedule the physician's time during your ultrasound.              Who to contact     If you have questions or need follow up information about today's clinic visit or your schedule please contact [facility] MATERNAL FETAL [facility] directly at  "[phone].  Normal or non-critical lab and imaging results will be communicated to you by MyChart, letter or phone within 4 business days after the clinic has received the results. If you do not hear from us within 7 days, please contact the clinic through Black Lotushart or phone. If you have a critical or abnormal lab result, we will notify you by phone as soon as possible.  Submit refill requests through New Vision or call your pharmacy and they will forward the refill request to us. Please allow 3 business days for your refill to be completed.          Additional Information About Your Visit        MyChart Information     New Vision lets you send messages to your doctor, view your test results, renew your prescriptions, schedule appointments and more. To sign up, go to [email or web address] . Click on \"Log in\" on the left side of the screen, which will take you to the Welcome page. Then click on \"Sign up Now\" on the right side of the page.     You will be asked to enter the access code listed below, as well as some personal information. Please follow the directions to create your username and password.     Your access code is: [email or web address]  Expires: 2018 10:24 PM     Your access code will  in 90 days. If you need help or a new code, please call your [place] clinic or [phone].        Care EveryWhere ID     This is your Care EveryWhere ID. This could be used by other organizations to access your [place] medical records  [id number]         Blood Pressure from Last 3 Encounters:   10/19/14 125/75    Weight from Last 3 Encounters:   No data found for Wt              Today, you had the following     No orders found for display       Primary Care Provider Office Phone # Fax #    Radha Rivera [phone]       [facility] [address]  [place] [address]        Equal Access to Services     HANK RAMOS AH: Victoria Santacruz, waandreada luvidaadaha, qaybta patricia quezada, " lacy lorenzoshekhar dennis'aan ah. So [facility] [phone].    ATENCIÓN: Si habla joseph, tiene a quevedo disposición servicios gratuitos de asistencia lingüística. Carmen al [phone].    We comply with applicable federal civil rights laws and [place] laws. We do not discriminate on the basis of race, color, national origin, age, disability, sex, sexual orientation, or gender identity.            Thank you!     Thank you for choosing [facility]  for your care. Our goal is always to provide you with excellent care. Hearing back from our patients is one way we can continue to improve our services. Please take a few minutes to complete the written survey that you may receive in the mail after your visit with us. Thank you!             Your Updated Medication List - Protect others around you: Learn how to safely use, store and throw away your medicines at www.disposemymeds.org.          This list is accurate as of 5/11/18 12:14 PM.  Always use your most recent med list.                   Brand Name Dispense Instructions for use Diagnosis    prenatal multivitamin plus iron 27-0.8 MG Tabs per tablet      Take 1 tablet by mouth daily

## 2018-05-21 LAB — GROUP B STREP PCR: NORMAL

## 2018-05-25 ENCOUNTER — HOSPITAL ENCOUNTER (OUTPATIENT)
Dept: ULTRASOUND IMAGING | Facility: CLINIC | Age: 33
Discharge: HOME | End: 2018-05-25
Attending: OBSTETRICS & GYNECOLOGY | Admitting: OBSTETRICS & GYNECOLOGY
Payer: COMMERCIAL

## 2018-05-25 ENCOUNTER — OFFICE VISIT (OUTPATIENT)
Dept: MATERNAL FETAL MEDICINE | Facility: CLINIC | Age: 33
End: 2018-05-25
Attending: OBSTETRICS & GYNECOLOGY
Payer: COMMERCIAL

## 2018-05-25 DIAGNOSIS — O36.5930 POOR FETAL GROWTH AFFECTING MANAGEMENT OF MOTHER IN THIRD TRIMESTER, SINGLE OR UNSPECIFIED FETUS: Primary | ICD-10-CM

## 2018-05-25 DIAGNOSIS — O35.9XX0 FETAL ABNORMALITY AFFECTING MANAGEMENT OF MOTHER, ANTEPARTUM, SINGLE OR UNSPECIFIED FETUS: ICD-10-CM

## 2018-05-25 DIAGNOSIS — O35.BXX0 ANOMALY OF HEART OF FETUS AFFECTING PREGNANCY, ANTEPARTUM, SINGLE OR UNSPECIFIED FETUS: ICD-10-CM

## 2018-05-25 PROCEDURE — 76816 OB US FOLLOW-UP PER FETUS: CPT

## 2018-05-25 PROCEDURE — 76819 FETAL BIOPHYS PROFIL W/O NST: CPT | Performed by: OBSTETRICS & GYNECOLOGY

## 2018-05-25 NOTE — MR AVS SNAPSHOT
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, antepartum, single or unspecified fetus           Follow-ups after your visit        Your next 10 appointments already scheduled     May 29, 2018  8:00 AM CDT   MFM BPP SINGLE with RHMFMUSR1   [facility] Maternal Fetal Medicine Ultrasound - [facility] ([facility])    [address]  [facility] [address]   [phone]            May 29, 2018  8:30 AM CDT   Radiology MD with RH ISRRAEL MORALES   [facility] Maternal Fetal Medicine [facility] ([facility])    [address]  [facility] [address]   [phone]           Please arrive at the time given for your first appointment. This visit is used internally to schedule the physician's time during your ultrasound.            Jun 01, 2018  3:00 PM CDT   MFM BPP SINGLE with RHMFMUSR3   [facility] Maternal Fetal Medicine Ultrasound - [facility] ([facility])    [address]  [facility] [address]   [phone]            Jun 01, 2018  3:30 PM CDT   Radiology MD with  ISRRAEL MORALES   [facility] Maternal Fetal Medicine [facility] ([facility])    [address]  [facility] [address]   [phone]           Please arrive at the time given for your first appointment. This visit is used internally to schedule the physician's time during your ultrasound.            Jun 05, 2018  3:00 PM CDT   MFM BPP SINGLE with RHMFMUSR3   [facility] Maternal Fetal Medicine Ultrasound - [facility] ([facility])    [address]  [facility] [address]  [facility] [address]   [phone]            Jun 05, 2018  3:30 PM CDT   Radiology MD with  ISRRAEL MORALES   [facility] Maternal Fetal Medicine [facility] ([facility])    [address]  [facility] [address] [phone]           Please arrive at the time given for your first appointment. This visit is used internally to schedule the physician's time during your ultrasound.            Jun 08, 2018 11:45 AM CDT   [facility] US COMPRE SINGLE F/U with RHMFMUSR1   [facility] Fetal Medicine Ultrasound - [facility] ([facility])    [address]  [facility] [address] [phone]           Wear comfortable clothes and leave your valuables at home.            Jun 08, 2018 12:15 PM CDT   Radiology MD with  ISRRAEL MORALES   [facility] ([facility])    [address]  [facility] [address] [phone]           Please arrive at the time given for your first appointment. This visit is used internally to schedule the physician's time during your ultrasound.              Future tests that were ordered for you today     Open Future Orders        Priority Expected Expires Ordered    M BPP Single Routine 5/29/2018 3/25/2019 5/25/2018    MFM BPP Single Routine 6/1/2018 3/25/2019 5/25/2018    MFM BPP Single Routine 6/5/2018 3/25/2019 5/25/2018    [facility] US Comprehensive Single F/U Routine 6/8/2018 5/25/2019 5/25/2018            Who to contact     If you have questions or need follow up information about today's clinic visit or your schedule please contact [facility] MATERNAL FETAL [facility] directly at [phone].  Normal or non-critical lab and imaging results will be communicated to you by MyChart, letter or phone within 4 business days after the clinic has received the results. If you do not hear from us within 7 days, please contact the clinic through MyChart or phone. If you have a  "critical or abnormal lab result, we will notify you by phone as soon as possible.  Submit refill requests through Hippflow or call your pharmacy and they will forward the refill request to us. Please allow 3 business days for your refill to be completed.          Additional Information About Your Visit        MyChart Information     Hippflow lets you send messages to your doctor, view your test results, renew your prescriptions, schedule appointments and more. To sign up, go to [email or web address] . Click on \"Log in\" on the left side of the screen, which will take you to the Welcome page. Then click on \"Sign up Now\" on the right side of the page.     You will be asked to enter the access code listed below, as well as some personal information. Please follow the directions to create your username and password.     Your access code is: [email or web address]  Expires: 2018  5:38 PM     Your access code will  in 90 days. If you need help or a new code, please call your [place] clinic or [phone].        Care EveryWhere ID     This is your Care EveryWhere ID. This could be used by other organizations to access your [place] medical records  [id number]         Blood Pressure from Last 3 Encounters:   10/19/14 125/75    Weight from Last 3 Encounters:   No data found for Wt               Primary Care Provider Office Phone # Fax #    Radha Rivera [phone]       [facility] [address]  [place] [address]        Equal Access to Services     [facility]: Hadii aad ku hadasho Soomaali, waaxda luqadaha, qaybta kaalmada adeegyada, lacy mccann . So [facility] [phone].    ATENCIÓN: Si habla español, tiene a quevedo disposición servicios gratuitos de asistencia lingüística. Llame al [phone].    We comply with applicable federal civil rights laws and [place] laws. We do not discriminate on the basis of race, color, national origin, age, disability, sex, " sexual orientation, or gender identity.            Thank you!     Thank you for choosing [facility]  for your care. Our goal is always to provide you with excellent care. Hearing back from our patients is one way we can continue to improve our services. Please take a few minutes to complete the written survey that you may receive in the mail after your visit with us. Thank you!             Your Updated Medication List - Protect others around you: Learn how to safely use, store and throw away your medicines at www.disposemymeds.org.          This list is accurate as of 5/25/18  5:38 PM.  Always use your most recent med list.                   Brand Name Dispense Instructions for use Diagnosis    prenatal multivitamin plus iron 27-0.8 MG Tabs per tablet      Take 1 tablet by mouth daily

## 2018-05-29 ENCOUNTER — HOSPITAL ENCOUNTER (OUTPATIENT)
Dept: ULTRASOUND IMAGING | Facility: CLINIC | Age: 33
Discharge: HOME | End: 2018-05-29
Attending: OBSTETRICS & GYNECOLOGY | Admitting: OBSTETRICS & GYNECOLOGY
Payer: COMMERCIAL

## 2018-05-29 ENCOUNTER — OFFICE VISIT (OUTPATIENT)
Dept: MATERNAL FETAL MEDICINE | Facility: CLINIC | Age: 33
End: 2018-05-29
Attending: OBSTETRICS & GYNECOLOGY
Payer: COMMERCIAL

## 2018-05-29 DIAGNOSIS — O36.5930 POOR FETAL GROWTH AFFECTING MANAGEMENT OF MOTHER IN THIRD TRIMESTER, SINGLE OR UNSPECIFIED FETUS: Primary | ICD-10-CM

## 2018-05-29 DIAGNOSIS — O36.5930 POOR FETAL GROWTH AFFECTING MANAGEMENT OF MOTHER IN THIRD TRIMESTER, SINGLE OR UNSPECIFIED FETUS: ICD-10-CM

## 2018-05-29 PROCEDURE — 76820 UMBILICAL ARTERY ECHO: CPT | Performed by: OBSTETRICS & GYNECOLOGY

## 2018-05-29 PROCEDURE — 76819 FETAL BIOPHYS PROFIL W/O NST: CPT

## 2018-05-29 NOTE — MR AVS SNAPSHOT
After Visit Summary   5/29/2018    Imelda Zepeda    MRN: [id number]           Patient Information     Date Of Birth          1985        Visit Information        Provider Department      5/29/2018 8:30 AM Lizzy Ervin MD [facility] Maternal Fetal Medicine [facility]        Today's Diagnoses     Poor fetal growth affecting management of mother in third trimester, single or unspecified fetus    -  1       Follow-ups after your visit        Your next 10 appointments already scheduled     Jun 01, 2018  3:00 PM CDT   MFM BPP SINGLE with [id number]   [facility] Maternal Fetal Medicine Ultrasound - [facility] ([facility])    [address]  [facility] [address]   [phone]            Jun 01, 2018  3:30 PM CDT   Radiology MD with  ISRRAEL MORALES   [facility] Maternal Fetal [facility] ([facility])    [address]  [facility] [address]   [phone]           Please arrive at the time given for your first appointment. This visit is used internally to schedule the physician's time during your ultrasound.            Jun 05, 2018  3:00 PM CDT   [facility] BPP SINGLE with RHMFMUSR3   [facility] Maternal Fetal Medicine Ultrasound - [facility] ([facility])    [address]  [facility] [address]   [phone]            Jun 05, 2018  3:30 PM CDT   Radiology MD with  ISRRAEL MORALES   [facility] Fetal Medicine [facility] ([facility])    [address]  [facility] [address]   [phone]           Please arrive at the time given for your first appointment. This visit is used internally to schedule the physician's time during your ultrasound.            Jun 08, 2018 11:45 AM CDT   M US COMPRE SINGLE F/U with RHMFMUSR1   [facility] Maternal Fetal Medicine Ultrasound - [facility] ([facility])    [address]  [facility] [address]   [phone]           Wear  "comfortable clothes and leave your valuables at home.            2018 12:15 PM CDT   Radiology MD with  ISRRAEL MORALES   [facility] Maternal Fetal Medicine [facility] ([facility])    [address] [facility] [address]  [facility] [id number] [phone]           Please arrive at the time given for your first appointment. This visit is used internally to schedule the physician's time during your ultrasound.              Who to contact     If you have questions or need follow up information about today's clinic visit or your schedule please contact [facility] MATERNAL FETAL MEDICINE [facility] directly at [phone].  Normal or non-critical lab and imaging results will be communicated to you by [employer], letter or phone within 4 business days after the clinic has received the results. If you do not hear from us within 7 days, please contact the clinic through [employer] or phone. If you have a critical or abnormal lab result, we will notify you by phone as soon as possible.  Submit refill requests through [email or web address] or call your pharmacy and they will forward the refill request to us. Please allow 3 business days for your refill to be completed.          Additional Information About Your Visit        [employer] Information     [email or web address] lets you send messages to your doctor, view your test results, renew your prescriptions, schedule appointments and more. To sign up, go to [email or web address] . Click on \"Log in\" on the left side of the screen, which will take you to the Welcome page. Then click on \"Sign up Now\" on the right side of the page.     You will be asked to enter the access code listed below, as well as some personal information. Please follow the directions to create your username and password.     Your access code is: [email or web address]  Expires: 2018  5:38 PM     Your access code will  in 90 days. If you need help or a new code, please call your [place] clinic or [phone].        Care " EveryWhere ID     This is your Care EveryWhere ID. This could be used by other organizations to access your [place] medical records  [id number]         Blood Pressure from Last 3 Encounters:   10/19/14 125/75    Weight from Last 3 Encounters:   No data found for Wt              Today, you had the following     No orders found for display       Primary Care Provider Office Phone # Fax #    Radha Rivera [phone]       [facility] [address]  [place] [address]        Equal Access to Services     [facility]: Hadii aad ku hadasho Soomaali, waaxda luqadaha, qaybta kaalmada adeegyada, waxay idiin hayaan adeeg kharatelma mccann . So [facility] [phone].    ATENCIÓN: Si habla español, tiene a quevedo disposición servicios gratuitos de asistencia lingüística. Llame al [phone].    We comply with applicable federal civil rights laws and [place] laws. We do not discriminate on the basis of race, color, national origin, age, disability, sex, sexual orientation, or gender identity.            Thank you!     Thank you for choosing [facility]  for your care. Our goal is always to provide you with excellent care. Hearing back from our patients is one way we can continue to improve our services. Please take a few minutes to complete the written survey that you may receive in the mail after your visit with us. Thank you!             Your Updated Medication List - Protect others around you: Learn how to safely use, store and throw away your medicines at www.disposemymeds.org.          This list is accurate as of 5/29/18  9:47 AM.  Always use your most recent med list.                   Brand Name Dispense Instructions for use Diagnosis    prenatal multivitamin plus iron 27-0.8 MG Tabs per tablet      Take 1 tablet by mouth daily

## 2018-05-29 NOTE — PROGRESS NOTES
Please see full imaging report from ViewPoint program under imaging tab.    BPP 8/8 with normal UAD.    Lizzy Ervin MD  Maternal Fetal Medicine

## 2018-06-01 ENCOUNTER — OFFICE VISIT (OUTPATIENT)
Dept: MATERNAL FETAL MEDICINE | Facility: CLINIC | Age: 33
End: 2018-06-01
Attending: OBSTETRICS & GYNECOLOGY
Payer: COMMERCIAL

## 2018-06-01 ENCOUNTER — HOSPITAL ENCOUNTER (OUTPATIENT)
Dept: ULTRASOUND IMAGING | Facility: CLINIC | Age: 33
Discharge: HOME | End: 2018-06-01
Attending: OBSTETRICS & GYNECOLOGY | Admitting: OBSTETRICS & GYNECOLOGY
Payer: COMMERCIAL

## 2018-06-01 DIAGNOSIS — O36.5930 POOR FETAL GROWTH AFFECTING MANAGEMENT OF MOTHER IN THIRD TRIMESTER, SINGLE OR UNSPECIFIED FETUS: ICD-10-CM

## 2018-06-01 DIAGNOSIS — O35.9XX0 FETAL ABNORMALITY AFFECTING MANAGEMENT OF MOTHER, SINGLE OR UNSPECIFIED FETUS: Primary | ICD-10-CM

## 2018-06-01 PROCEDURE — 76820 UMBILICAL ARTERY ECHO: CPT | Performed by: OBSTETRICS & GYNECOLOGY

## 2018-06-01 PROCEDURE — 76819 FETAL BIOPHYS PROFIL W/O NST: CPT

## 2018-06-01 NOTE — PROGRESS NOTES
Please see full imaging report from ViewPoint program under imaging tab.    BPP 8/8    Lizzy Ervin MD  Maternal Fetal Medicine

## 2018-06-01 NOTE — MR AVS SNAPSHOT
After Visit Summary   6/1/2018    Imelda Zepeda    MRN: [id number]           Patient Information     Date Of Birth          1985        Visit Information        Provider Department      6/1/2018 3:30 PM Lizzy Ervin MD [facility] Maternal Fetal Medicine [facility]        Today's Diagnoses     Fetal abnormality affecting management of mother, single or unspecified fetus    -  1       Follow-ups after your visit        Your next 10 appointments already scheduled     Jun 05, 2018  3:00 PM CDT   MFM BPP SINGLE with [email or web address]   [facility] Maternal Fetal Medicine Ultrasound - [facility] ([facility])    [address]  [facility] [address]   [phone]            Jun 05, 2018  3:30 PM CDT   Radiology MD with  ISRRAEL MORALES   [facility] Maternal Fetal Medicine [facility] ([facility])    [address]  [facility] [id number] [phone]           Please arrive at the time given for your first appointment. This visit is used internally to schedule the physician's time during your ultrasound.            Jun 08, 2018 11:45 AM CDT   M US COMPRE SINGLE F/U with RHMFMUSR1   [facility] Maternal Fetal Medicine Ultrasound - [facility] ([facility])    [address]  [facility] [address] [phone]           Wear comfortable clothes and leave your valuables at home.            Jun 08, 2018 12:15 PM CDT   Radiology MD with  ISRRAEL MORALES   [facility] Maternal Fetal Medicine [facility] ([facility])    [address]  [facility] [id number] [phone]           Please arrive at the time given for your first appointment. This visit is used internally to schedule the physician's time during your ultrasound.              Who to contact     If you have questions or need follow up information about today's clinic visit or your schedule please contact [facility] MATERNAL FETAL MEDICINE [facility] directly at  "[phone].  Normal or non-critical lab and imaging results will be communicated to you by MyChart, letter or phone within 4 business days after the clinic has received the results. If you do not hear from us within 7 days, please contact the clinic through MyChart or phone. If you have a critical or abnormal lab result, we will notify you by phone as soon as possible.  Submit refill requests through [employer] or call your pharmacy and they will forward the refill request to us. Please allow 3 business days for your refill to be completed.          Additional Information About Your Visit        [employer] Information     [employer] lets you send messages to your doctor, view your test results, renew your prescriptions, schedule appointments and more. To sign up, go to [email or web address] . Click on \"Log in\" on the left side of the screen, which will take you to the Welcome page. Then click on \"Sign up Now\" on the right side of the page.     You will be asked to enter the access code listed below, as well as some personal information. Please follow the directions to create your username and password.     Your access code is: [email or web address]  Expires: 2018  5:38 PM     Your access code will  in 90 days. If you need help or a new code, please call your [place] clinic or [phone].        Care EveryWhere ID     This is your Care EveryWhere ID. This could be used by other organizations to access your [place] medical records  [id number]         Blood Pressure from Last 3 Encounters:   10/19/14 125/75    Weight from Last 3 Encounters:   No data found for Wt              Today, you had the following     No orders found for display       Primary Care Provider Office Phone # Fax #    Angel Kirk PA-C [phone]       [facility] [address] [address]        Equal Access to Services     HANK RAMOS AH: Victoria Santacruz, waandreada lugurinder, qaybta lacy flood " anna madrigalluis antonio laGalloaaberta ah. So [facility] [phone].    ATENCIÓN: Si habla joseph, tiene a quevedo disposición servicios gratuitos de asistencia lingüística. Carmen al [phone].    We comply with applicable federal civil rights laws and [place] laws. We do not discriminate on the basis of race, color, national origin, age, disability, sex, sexual orientation, or gender identity.            Thank you!     Thank you for choosing [facility]  for your care. Our goal is always to provide you with excellent care. Hearing back from our patients is one way we can continue to improve our services. Please take a few minutes to complete the written survey that you may receive in the mail after your visit with us. Thank you!             Your Updated Medication List - Protect others around you: Learn how to safely use, store and throw away your medicines at www.disposemymeds.org.          This list is accurate as of 6/1/18  3:44 PM.  Always use your most recent med list.                   Brand Name Dispense Instructions for use Diagnosis    prenatal multivitamin plus iron 27-0.8 MG Tabs per tablet      Take 1 tablet by mouth daily

## 2018-06-03 ENCOUNTER — HOSPITAL ENCOUNTER (INPATIENT)
Facility: CLINIC | Age: 33
LOS: 5 days | Discharge: HOME | End: 2018-06-09
Attending: OBSTETRICS & GYNECOLOGY | Admitting: OBSTETRICS & GYNECOLOGY
Payer: COMMERCIAL

## 2018-06-03 DIAGNOSIS — O14.90 PRE-ECLAMPSIA, ANTEPARTUM: Primary | ICD-10-CM

## 2018-06-03 DIAGNOSIS — Z98.891 S/P CESAREAN SECTION: ICD-10-CM

## 2018-06-03 PROBLEM — O12.10 PROTEINURIA AFFECTING PREGNANCY: Status: ACTIVE | Noted: 2018-06-03

## 2018-06-03 LAB
ALT SERPL W P-5'-P-CCNC: 24 U/L (ref 0–50)
ANION GAP SERPL CALCULATED.3IONS-SCNC: 9 MMOL/L (ref 3–14)
AST SERPL W P-5'-P-CCNC: 35 U/L (ref 0–45)
BUN SERPL-MCNC: 19 MG/DL (ref 7–30)
CALCIUM SERPL-MCNC: 7.8 MG/DL (ref 8.5–10.1)
CHLORIDE SERPL-SCNC: 95 MMOL/L (ref 94–109)
CO2 SERPL-SCNC: 25 MMOL/L (ref 20–32)
CREAT SERPL-MCNC: 0.69 MG/DL (ref 0.52–1.04)
CREAT UR-MCNC: 18 MG/DL
ERYTHROCYTE [DISTWIDTH] IN BLOOD BY AUTOMATED COUNT: 11.9 % (ref 10–15)
GFR SERPL CREATININE-BSD FRML MDRD: >90 ML/MIN/1.7M2
GLUCOSE SERPL-MCNC: 76 MG/DL (ref 70–99)
HCT VFR BLD AUTO: 32.9 % (ref 35–47)
HGB BLD-MCNC: 11.5 G/DL (ref 11.7–15.7)
MCH RBC QN AUTO: 32.1 PG (ref 26.5–33)
MCHC RBC AUTO-ENTMCNC: 35 G/DL (ref 31.5–36.5)
MCV RBC AUTO: 92 FL (ref 78–100)
PLATELET # BLD AUTO: 184 10E9/L (ref 150–450)
POTASSIUM SERPL-SCNC: 4.2 MMOL/L (ref 3.4–5.3)
PROT UR-MCNC: 1.22 G/L
PROT/CREAT 24H UR: 6.61 G/G CR (ref 0–0.2)
RBC # BLD AUTO: 3.58 10E12/L (ref 3.8–5.2)
SODIUM SERPL-SCNC: 129 MMOL/L (ref 133–144)
URATE SERPL-MCNC: 4.6 MG/DL (ref 2.6–6)
WBC # BLD AUTO: 10.2 10E9/L (ref 4–11)

## 2018-06-03 PROCEDURE — 84460 ALANINE AMINO (ALT) (SGPT): CPT | Performed by: OBSTETRICS & GYNECOLOGY

## 2018-06-03 PROCEDURE — 59025 FETAL NON-STRESS TEST: CPT

## 2018-06-03 PROCEDURE — 85027 COMPLETE CBC AUTOMATED: CPT | Performed by: OBSTETRICS & GYNECOLOGY

## 2018-06-03 PROCEDURE — 36415 COLL VENOUS BLD VENIPUNCTURE: CPT | Performed by: OBSTETRICS & GYNECOLOGY

## 2018-06-03 PROCEDURE — 84550 ASSAY OF BLOOD/URIC ACID: CPT | Performed by: OBSTETRICS & GYNECOLOGY

## 2018-06-03 PROCEDURE — 80048 BASIC METABOLIC PNL TOTAL CA: CPT | Performed by: OBSTETRICS & GYNECOLOGY

## 2018-06-03 PROCEDURE — 84450 TRANSFERASE (AST) (SGOT): CPT | Performed by: OBSTETRICS & GYNECOLOGY

## 2018-06-03 PROCEDURE — 84156 ASSAY OF PROTEIN URINE: CPT | Performed by: OBSTETRICS & GYNECOLOGY

## 2018-06-03 RX ORDER — ONDANSETRON 2 MG/ML
4 INJECTION INTRAMUSCULAR; INTRAVENOUS EVERY 6 HOURS PRN
Status: DISCONTINUED | OUTPATIENT
Start: 2018-06-03 | End: 2018-06-03

## 2018-06-03 RX ORDER — CALCIUM CARBONATE 500 MG/1
2 TABLET, CHEWABLE ORAL 2 TIMES DAILY
COMMUNITY

## 2018-06-03 RX ORDER — FLUOXETINE 10 MG/1
10 CAPSULE ORAL DAILY
Status: DISCONTINUED | OUTPATIENT
Start: 2018-06-04 | End: 2018-06-04

## 2018-06-03 NOTE — IP AVS SNAPSHOT
[address]    [place] [address]    Phone:  [phone]                                       After Visit Summary   6/3/2018    Imelda Zepeda    MRN: [id number]           After Visit Summary Signature Page     I have received my discharge instructions, and my questions have been answered. I have discussed any challenges I see with this plan with the nurse or doctor.    ..........................................................................................................................................  Patient/Patient Representative Signature      ..........................................................................................................................................  Patient Representative Print Name and Relationship to Patient    ..................................................               ................................................  Date                                            Time    ..........................................................................................................................................  Reviewed by Signature/Title    ...................................................              ..............................................  Date                                                            Time

## 2018-06-03 NOTE — IP AVS SNAPSHOT
MRN:[id number]                      After Visit Summary   6/3/2018    Imelda Zepeda    MRN: [id number]           Thank you!     Thank you for choosing [facility] for your care. Our goal is always to provide you with excellent care. Hearing back from our patients is one way we can continue to improve our services. Please take a few minutes to complete the written survey that you may receive in the mail after you visit with us. Thank you!        Patient Information     Date Of Birth          1985        About your hospital stay     You were admitted on:  Mariama 3, 2018 You last received care in the:  [address]    You were discharged on:  2018       Who to Call     For medical emergencies, please call 911.  For non-urgent questions about your medical care, please call your primary care provider or clinic, [phone]  For questions related to your surgery, please call your surgery clinic        Attending Provider     Provider Specialty    Radha Rivera MD OB/Gyn    Modesto Alfonso MD OB/Gyn    Miguel, Tory PINEDA MD OB/Gyn       Primary Care Provider Office Phone # Fax #    Angel Kirk PA-C [phone]      Further instructions from your care team       Postop  Birth Instructions    Activity       Do not lift more than 10 pounds for 6 weeks after surgery.  Ask family and friends for help when you need it.    No driving until you have stopped taking your pain medications (usually two weeks after surgery).    No heavy exercise or activity for 6 weeks.  Don't do anything that will put a strain on your surgery site.    Don't strain when using the toilet.  Your care team may prescribe a stool softener if you have problems with your bowel movements.     To care for your incision:       Keep the incision clean and dry.    Do not soak your incision in water. No swimming or hot tubs until it has fully healed. You may soak in the bathtub if the water  level is below your incision.    Do not use peroxide, gel, cream, lotion, or ointment on your incision.    Adjust your clothes to avoid pressure on your surgery site (check the elastic in your underwear for example).     You may see a small amount of clear or pink drainage and this is normal.  Check with your health care provider:       If the drainage increases or has an odor.    If the incision reddens, you have swelling, or develop a rash.    If you have increased pain and the medicine we prescribed doesn't help.    If you have a fever above 100.4 F (38 C) with or without chills when placing thermometer under your tongue.   The area around your incision (surgery wound), will feel numb.  This is normal. The numbness should go away in less than a year.     Keep your hands clean:  Always wash your hands before touching your incision (surgery wound). This helps reduce your risk of infection. If your hands aren't dirty, you may use an alcohol hand-rub to clean your hands. Keep your nails clean and short.    Call your healthcare provider if you have any of these symptoms:       You soak a sanitary pad with blood within 1 hour, or you see blood clots larger than a golf ball.    Bleeding that lasts more than 6 weeks.    Vaginal discharge that smells bad.    Severe pain, cramping or tenderness in your lower belly area.    A need to urinate more frequently (use the toilet more often), more urgently (use the toilet very quickly), or it burns when you urinate.    Nausea and vomiting.    Redness, swelling or pain around a vein in your leg.    Problems breastfeeding or a red or painful area on your breast.    Chest pain and cough or are gasping for air.    Problems with coping with sadness, anxiety or depression. If you have concerns about hurting yourself or the baby, call your provider immediately.      You have questions or concerns after you return home.     Call your provider if you notice:  Swelling in your face or  "increased swelling in your hands or legs.  Headaches that are not relieved by Tylenol (acetaminophen).  Changes in your vision (blurring: seeing spots or stars.)  Nausea (sick to your stomach) and vomiting (throwing up).   Weight gain of 5 pounds or more per week.  Heartburn that doesn't go away.  Signs of bladder infection: pain when you urinate (use the toilet), need to go more often and more urgently.    Follow-up:  Monday        Pending Results     Date and Time Order Name Status Description    2018 ABO/Rh type and screen In process     2018 0600 EKG 12 lead - pediatric Preliminary             Admission Information     Date & Time Provider Department Dept. Phone    6/3/2018 Tory Rivera MD [address] [phone]      Your Vitals Were     Blood Pressure Pulse Temperature Respirations Height Weight    118/82 72 98  F (36.7  C) (Oral) 16 1.537 m (5' 0.5\") 61 kg (134 lb 7.7 oz)    Pulse Oximetry BMI (Body Mass Index)                97% 25.83 kg/m2          MyChart Information     [employer] lets you send messages to your doctor, view your test results, renew your prescriptions, schedule appointments and more. To sign up, go to [email or web address] . Click on \"Log in\" on the left side of the screen, which will take you to the Welcome page. Then click on \"Sign up Now\" on the right side of the page.     You will be asked to enter the access code listed below, as well as some personal information. Please follow the directions to create your username and password.     Your access code is: [email or web address]  Expires: 2018  5:38 PM     Your access code will  in 90 days. If you need help or a new code, please call your [facility] clinic or [phone].        Care EveryWhere ID     This is your Care EveryWhere ID. This could be used by other organizations to access your [facility] medical records  [id number]        Equal Access to Services     HANK RAMOS AH: Victoria hunter " deb Santacruz, waaxda luqadaha, qaybta kaalmada adedev, lacy tyrain hayaaberta lorenzoshekhar kaitlinluis antonio laGallomago izaiah So [facility] [phone].    ATENCIÓN: Si habla joseph, tiene a quevedo disposición servicios gratuitos de asistencia lingüística. Carmen al [phone].    We comply with applicable federal civil rights laws and [place] laws. We do not discriminate on the basis of race, color, national origin, age, disability, sex, sexual orientation, or gender identity.               Review of your medicines      START taking        Dose / Directions    HYDROmorphone 2 MG tablet   Commonly known as:  DILAUDID        Dose:  2 mg   Take 1 tablet (2 mg) by mouth every 3 hours as needed for moderate to severe pain   Quantity:  20 tablet   Refills:  0       labetalol 200 MG tablet   Commonly known as:  NORMODYNE   Used for:  Pre-eclampsia, antepartum        Dose:  200 mg   Take 1 tablet (200 mg) by mouth every 12 hours   Quantity:  60 tablet   Refills:  0       NIFEdipine ER 30 MG Tb24   Commonly known as:  ADALAT CC   Used for:  Pre-eclampsia, antepartum        Dose:  30 mg   Start taking on:  6/10/2018   Take 1 tablet (30 mg) by mouth daily   Quantity:  30 tablet   Refills:  0         CONTINUE these medicines which have NOT CHANGED        Dose / Directions    calcium carbonate 500 MG chewable tablet   Commonly known as:  TUMS        Dose:  2 chew tab   Take 2 chew tab by mouth 2 times daily   Refills:  0       prenatal multivitamin plus iron 27-0.8 MG Tabs per tablet        Dose:  1 tablet   Take 1 tablet by mouth daily   Refills:  0       PROZAC PO        Dose:  10 mg   Take 10 mg by mouth daily   Refills:  0            Where to get your medicines      Some of these will need a paper prescription and others can be bought over the counter. Ask your nurse if you have questions.     Bring a paper prescription for each of these medications     HYDROmorphone 2 MG tablet    labetalol 200 MG tablet    NIFEdipine ER 30 MG Tb24                 Protect others around you: Learn how to safely use, store and throw away your medicines at www.disposemymeds.org.        Information about OPIOIDS     PRESCRIPTION OPIOIDS: WHAT YOU NEED TO KNOW   You have a prescription for an opioid (narcotic) pain medicine. Opioids can cause addiction. If you have a history of chemical dependency of any type, you are at a higher risk of becoming addicted to opioids. Only take this medicine after all other options have been tried. Take it for as short a time and as few doses as possible.     Do not:    Drive. If you drive while taking these medicines, you could be arrested for driving under the influence (DUI).    Operate heavy machinery    Do any other dangerous activities while taking these medicines.     Drink any alcohol while taking these medicines.      Take with any other medicines that contain acetaminophen. Read all labels carefully. Look for the word  acetaminophen  or  Tylenol.  Ask your pharmacist if you have questions or are unsure.    Store your pills in a secure place, locked if possible. We will not replace any lost or stolen medicine. If you don t finish your medicine, please throw away (dispose) as directed by your pharmacist. The [place] Pollution Control Agency has more information about safe disposal: https://www.pca.Replaced by [facility].[place]/living-green/managing-unwanted-medications    All opioids tend to cause constipation. Drink plenty of water and eat foods that have a lot of fiber, such as fruits, vegetables, prune juice, apple juice and high-fiber cereal. Take a laxative (Miralax, milk of magnesia, Colace, Senna) if you don t move your bowels at least every other day.              Medication List: This is a list of all your medications and when to take them. Check marks below indicate your daily home schedule. Keep this list as a reference.      Medications           Morning Afternoon Evening Bedtime As Needed    calcium carbonate 500 MG chewable tablet   Commonly  known as:  TUMS   Take 2 chew tab by mouth 2 times daily                                HYDROmorphone 2 MG tablet   Commonly known as:  DILAUDID   Take 1 tablet (2 mg) by mouth every 3 hours as needed for moderate to severe pain   Last time this was given:  2 mg on 6/9/2018 12:27 AM   Next Dose Due:  available                                labetalol 200 MG tablet   Commonly known as:  NORMODYNE   Take 1 tablet (200 mg) by mouth every 12 hours   Last time this was given:  200 mg on 6/9/2018  8:39 AM   Next Dose Due:  8:30 pm                                NIFEdipine ER 30 MG Tb24   Commonly known as:  ADALAT CC   Take 1 tablet (30 mg) by mouth daily   Start taking on:  6/10/2018   Last time this was given:  30 mg on 6/9/2018  8:38 AM   Next Dose Due:  6/10/18 at 8:00 am                                  prenatal multivitamin plus iron 27-0.8 MG Tabs per tablet   Take 1 tablet by mouth daily   Last time this was given:  1 tablet on 6/9/2018  8:38 AM                                PROZAC PO   Take 10 mg by mouth daily   Last time this was given:  10 mg on 6/9/2018  8:39 AM                                          More Information        Understanding Preeclampsia  Preeclampsia is pregnancy-related hypertension that develops after 20 weeks' gestation. It can lead to health risks for you and your baby. No one knows what causes preeclampsia. But it is known that the only cure is delivery.     Your blood pressure will be monitored regularly throughout your pregnancy to help check for preeclampsia.   Signs and symptoms  A common sign of preeclampsia is high blood pressure. Other signs and symptoms may include:    Rapid weight gain    Protein in your urine    Headache    Abdominal pain on your right side    Vision problems (flashes or spots)    Edema (swelling) in your face or hands (this also commonly happens near the end of normal pregnancies, even without preeclampsia)  Tests you may have  Your healthcare provider will  want to check your blood pressure throughout your pregnancy. If your blood pressure is high, you may have the following tests:    Urine tests to look for protein    Blood tests to confirm preeclampsia    Fetal monitoring to ensure that your baby is healthy  Treating preeclampsia  A daily low dose of aspirin may be prescribed to those at risk for preeclampsia. Preeclampsia almost always ends soon after you give birth. Until then, your healthcare provider can help manage your condition. If your symptoms are mild, you may need bed rest at home. If your symptoms are severe, you will be hospitalized. Hospital treatment includes:    Complete bed rest to help control blood pressure    Magnesium IV (intravenous) drip during labor to prevent seizures    Induced labor or surgical delivery by  section  When to call your healthcare provider  Call your healthcare provider if swelling, weight gain, or other symptoms come on quickly or are severe. Some cases of preeclampsia are more severe than others. Your signs and symptoms also may change or worsen as you get closer to your due date.  Who s at risk?  Preeclampsia can happen in any pregnant woman. Factors that increase the risk include:    Previous pregnancies. Preeclampsia, intrauterine growth retardation (IUGR),  birth, placental abruption, or fetal death    Medical history of mother. Diabetes, high blood pressure, obesity, kidney disease, autoimmune disease (for example lupus), or family history of preeclampsia    Current pregnancy. First pregnancy, multiple fetuses, over the age of 40 years, or in vitro fertilization  Dangers of preeclampsia  If not treated, preeclampsia can cause problems for you and your baby. The placenta (organ that nourishes your baby) may tear away from the uterine wall. This can lead to fetal distress (the baby is at risk for health problems) and premature delivery. Preeclampsia can also cause these health problems:    Kidney failure or  other organ damage    Seizures    Stroke  Once you give birth  In most cases, preeclampsia goes away on its own soon after you give birth. Within days of delivery, your blood pressure, swelling, and other signs should decrease.  Date Last Reviewed: 6/1/2016 2000-2017 [employer]. [address] [place] [address]. All rights reserved. This information is not intended as a substitute for professional medical care. Always follow your healthcare professional's instructions.

## 2018-06-04 ENCOUNTER — ANESTHESIA EVENT (OUTPATIENT)
Dept: OBGYN | Facility: CLINIC | Age: 33
End: 2018-06-04
Payer: COMMERCIAL

## 2018-06-04 ENCOUNTER — ANESTHESIA (OUTPATIENT)
Dept: OBGYN | Facility: CLINIC | Age: 33
End: 2018-06-04
Payer: COMMERCIAL

## 2018-06-04 PROBLEM — Z98.891 S/P CESAREAN SECTION: Status: ACTIVE | Noted: 2018-06-04

## 2018-06-04 LAB
ABO + RH BLD: NORMAL
ALT SERPL W P-5'-P-CCNC: 22 U/L (ref 0–50)
AST SERPL W P-5'-P-CCNC: 31 U/L (ref 0–45)
BASOPHILS # BLD AUTO: 0 10E9/L (ref 0–0.2)
BASOPHILS NFR BLD AUTO: 0.3 %
BLD GP AB SCN SERPL QL: NORMAL
BLOOD BANK CMNT PATIENT-IMP: NORMAL
BLOOD BANK CMNT PATIENT-IMP: NORMAL
DIFFERENTIAL METHOD BLD: ABNORMAL
EOSINOPHIL # BLD AUTO: 0.1 10E9/L (ref 0–0.7)
EOSINOPHIL NFR BLD AUTO: 0.9 %
ERYTHROCYTE [DISTWIDTH] IN BLOOD BY AUTOMATED COUNT: 12.1 % (ref 10–15)
HCT VFR BLD AUTO: 34.3 % (ref 35–47)
HGB BLD-MCNC: 12 G/DL (ref 11.7–15.7)
IMM GRANULOCYTES # BLD: 0 10E9/L (ref 0–0.4)
IMM GRANULOCYTES NFR BLD: 0.1 %
LYMPHOCYTES # BLD AUTO: 2.7 10E9/L (ref 0.8–5.3)
LYMPHOCYTES NFR BLD AUTO: 34.3 %
MCH RBC QN AUTO: 32.3 PG (ref 26.5–33)
MCHC RBC AUTO-ENTMCNC: 35 G/DL (ref 31.5–36.5)
MCV RBC AUTO: 93 FL (ref 78–100)
MONOCYTES # BLD AUTO: 0.6 10E9/L (ref 0–1.3)
MONOCYTES NFR BLD AUTO: 7.7 %
NEUTROPHILS # BLD AUTO: 4.5 10E9/L (ref 1.6–8.3)
NEUTROPHILS NFR BLD AUTO: 56.7 %
NRBC # BLD AUTO: 0 10*3/UL
NRBC BLD AUTO-RTO: 0 /100
PLATELET # BLD AUTO: 172 10E9/L (ref 150–450)
PLATELET # BLD AUTO: 179 10E9/L (ref 150–450)
RBC # BLD AUTO: 3.71 10E12/L (ref 3.8–5.2)
SPECIMEN EXP DATE BLD: NORMAL
SPECIMEN EXP DATE BLD: NORMAL
WBC # BLD AUTO: 7.9 10E9/L (ref 4–11)

## 2018-06-04 PROCEDURE — 36415 COLL VENOUS BLD VENIPUNCTURE: CPT | Performed by: OBSTETRICS & GYNECOLOGY

## 2018-06-04 PROCEDURE — 36000056 ZZH SURGERY LEVEL 3 1ST 30 MIN: Performed by: OBSTETRICS & GYNECOLOGY

## 2018-06-04 PROCEDURE — 86850 RBC ANTIBODY SCREEN: CPT | Performed by: OBSTETRICS & GYNECOLOGY

## 2018-06-04 PROCEDURE — 25000125 ZZHC RX 250: Performed by: ANESTHESIOLOGY

## 2018-06-04 PROCEDURE — 37000009 ZZH ANESTHESIA TECHNICAL FEE, EACH ADDTL 15 MIN: Performed by: OBSTETRICS & GYNECOLOGY

## 2018-06-04 PROCEDURE — 84450 TRANSFERASE (AST) (SGOT): CPT | Performed by: OBSTETRICS & GYNECOLOGY

## 2018-06-04 PROCEDURE — 12000037 ZZH R&B POSTPARTUM INTERMEDIATE

## 2018-06-04 PROCEDURE — 25000132 ZZH RX MED GY IP 250 OP 250 PS 637: Performed by: OBSTETRICS & GYNECOLOGY

## 2018-06-04 PROCEDURE — 25000125 ZZHC RX 250: Performed by: OBSTETRICS & GYNECOLOGY

## 2018-06-04 PROCEDURE — 25000128 H RX IP 250 OP 636: Performed by: OBSTETRICS & GYNECOLOGY

## 2018-06-04 PROCEDURE — 86900 BLOOD TYPING SEROLOGIC ABO: CPT | Performed by: OBSTETRICS & GYNECOLOGY

## 2018-06-04 PROCEDURE — 27210794 ZZH OR GENERAL SUPPLY STERILE: Performed by: OBSTETRICS & GYNECOLOGY

## 2018-06-04 PROCEDURE — 27110038 ZZH RX 271: Performed by: ANESTHESIOLOGY

## 2018-06-04 PROCEDURE — 25000132 ZZH RX MED GY IP 250 OP 250 PS 637

## 2018-06-04 PROCEDURE — 25000128 H RX IP 250 OP 636

## 2018-06-04 PROCEDURE — 10907ZC DRAINAGE OF AMNIOTIC FLUID, THERAPEUTIC FROM PRODUCTS OF CONCEPTION, VIA NATURAL OR ARTIFICIAL OPENING: ICD-10-PCS | Performed by: OBSTETRICS & GYNECOLOGY

## 2018-06-04 PROCEDURE — 00HU33Z INSERTION OF INFUSION DEVICE INTO SPINAL CANAL, PERCUTANEOUS APPROACH: ICD-10-PCS | Performed by: ANESTHESIOLOGY

## 2018-06-04 PROCEDURE — 25000128 H RX IP 250 OP 636: Performed by: ANESTHESIOLOGY

## 2018-06-04 PROCEDURE — 37000011 ZZH ANESTHESIA WARD SERVICE

## 2018-06-04 PROCEDURE — 85049 AUTOMATED PLATELET COUNT: CPT | Performed by: OBSTETRICS & GYNECOLOGY

## 2018-06-04 PROCEDURE — 85025 COMPLETE CBC W/AUTO DIFF WBC: CPT | Performed by: OBSTETRICS & GYNECOLOGY

## 2018-06-04 PROCEDURE — 37000008 ZZH ANESTHESIA TECHNICAL FEE, 1ST 30 MIN: Performed by: OBSTETRICS & GYNECOLOGY

## 2018-06-04 PROCEDURE — 84460 ALANINE AMINO (ALT) (SGPT): CPT | Performed by: OBSTETRICS & GYNECOLOGY

## 2018-06-04 PROCEDURE — 25000125 ZZHC RX 250: Performed by: NURSE ANESTHETIST, CERTIFIED REGISTERED

## 2018-06-04 PROCEDURE — 86780 TREPONEMA PALLIDUM: CPT | Performed by: OBSTETRICS & GYNECOLOGY

## 2018-06-04 PROCEDURE — 71000012 ZZH RECOVERY PHASE 1 LEVEL 1 FIRST HR: Performed by: OBSTETRICS & GYNECOLOGY

## 2018-06-04 PROCEDURE — 36000058 ZZH SURGERY LEVEL 3 EA 15 ADDTL MIN: Performed by: OBSTETRICS & GYNECOLOGY

## 2018-06-04 PROCEDURE — 3E0R3BZ INTRODUCTION OF ANESTHETIC AGENT INTO SPINAL CANAL, PERCUTANEOUS APPROACH: ICD-10-PCS | Performed by: ANESTHESIOLOGY

## 2018-06-04 PROCEDURE — G0378 HOSPITAL OBSERVATION PER HR: HCPCS

## 2018-06-04 PROCEDURE — 86901 BLOOD TYPING SEROLOGIC RH(D): CPT | Performed by: OBSTETRICS & GYNECOLOGY

## 2018-06-04 PROCEDURE — 25000128 H RX IP 250 OP 636: Performed by: NURSE ANESTHETIST, CERTIFIED REGISTERED

## 2018-06-04 RX ORDER — ACETAMINOPHEN 325 MG/1
650 TABLET ORAL EVERY 4 HOURS PRN
Status: DISCONTINUED | OUTPATIENT
Start: 2018-06-04 | End: 2018-06-04

## 2018-06-04 RX ORDER — LANOLIN 100 %
OINTMENT (GRAM) TOPICAL
Status: DISCONTINUED | OUTPATIENT
Start: 2018-06-04 | End: 2018-06-09 | Stop reason: HOSPADM

## 2018-06-04 RX ORDER — MORPHINE SULFATE 1 MG/ML
INJECTION, SOLUTION EPIDURAL; INTRATHECAL; INTRAVENOUS
Status: COMPLETED
Start: 2018-06-04 | End: 2018-06-04

## 2018-06-04 RX ORDER — AMOXICILLIN 250 MG
1 CAPSULE ORAL 2 TIMES DAILY PRN
Status: DISCONTINUED | OUTPATIENT
Start: 2018-06-04 | End: 2018-06-09 | Stop reason: HOSPADM

## 2018-06-04 RX ORDER — ACETAMINOPHEN 325 MG/1
TABLET ORAL
Status: COMPLETED
Start: 2018-06-04 | End: 2018-06-04

## 2018-06-04 RX ORDER — OXYTOCIN 10 [USP'U]/ML
10 INJECTION, SOLUTION INTRAMUSCULAR; INTRAVENOUS
Status: DISCONTINUED | OUTPATIENT
Start: 2018-06-04 | End: 2018-06-09 | Stop reason: HOSPADM

## 2018-06-04 RX ORDER — CARBOPROST TROMETHAMINE 250 UG/ML
250 INJECTION, SOLUTION INTRAMUSCULAR
Status: DISCONTINUED | OUTPATIENT
Start: 2018-06-04 | End: 2018-06-04

## 2018-06-04 RX ORDER — MORPHINE SULFATE 1 MG/ML
INJECTION, SOLUTION EPIDURAL; INTRATHECAL; INTRAVENOUS PRN
Status: DISCONTINUED | OUTPATIENT
Start: 2018-06-04 | End: 2018-06-04

## 2018-06-04 RX ORDER — ROPIVACAINE HYDROCHLORIDE 2 MG/ML
10 INJECTION, SOLUTION EPIDURAL; INFILTRATION; PERINEURAL ONCE
Status: COMPLETED | OUTPATIENT
Start: 2018-06-04 | End: 2018-06-04

## 2018-06-04 RX ORDER — CEFAZOLIN SODIUM 2 G/100ML
2 INJECTION, SOLUTION INTRAVENOUS
Status: DISCONTINUED | OUTPATIENT
Start: 2018-06-04 | End: 2018-06-04

## 2018-06-04 RX ORDER — ONDANSETRON 2 MG/ML
INJECTION INTRAMUSCULAR; INTRAVENOUS
Status: DISCONTINUED
Start: 2018-06-04 | End: 2018-06-04 | Stop reason: HOSPADM

## 2018-06-04 RX ORDER — ONDANSETRON 2 MG/ML
4 INJECTION INTRAMUSCULAR; INTRAVENOUS EVERY 6 HOURS PRN
Status: DISCONTINUED | OUTPATIENT
Start: 2018-06-04 | End: 2018-06-09 | Stop reason: HOSPADM

## 2018-06-04 RX ORDER — DIPHENHYDRAMINE HYDROCHLORIDE 50 MG/ML
25 INJECTION INTRAMUSCULAR; INTRAVENOUS EVERY 6 HOURS PRN
Status: DISCONTINUED | OUTPATIENT
Start: 2018-06-04 | End: 2018-06-09 | Stop reason: HOSPADM

## 2018-06-04 RX ORDER — ACETAMINOPHEN 325 MG/1
975 TABLET ORAL EVERY 8 HOURS
Status: COMPLETED | OUTPATIENT
Start: 2018-06-04 | End: 2018-06-07

## 2018-06-04 RX ORDER — SODIUM CHLORIDE, SODIUM LACTATE, POTASSIUM CHLORIDE, CALCIUM CHLORIDE 600; 310; 30; 20 MG/100ML; MG/100ML; MG/100ML; MG/100ML
INJECTION, SOLUTION INTRAVENOUS CONTINUOUS
Status: DISCONTINUED | OUTPATIENT
Start: 2018-06-04 | End: 2018-06-04

## 2018-06-04 RX ORDER — OXYTOCIN/0.9 % SODIUM CHLORIDE 30/500 ML
1-24 PLASTIC BAG, INJECTION (ML) INTRAVENOUS CONTINUOUS
Status: DISCONTINUED | OUTPATIENT
Start: 2018-06-04 | End: 2018-06-04

## 2018-06-04 RX ORDER — ACETAMINOPHEN 325 MG/1
650 TABLET ORAL EVERY 4 HOURS PRN
Status: DISCONTINUED | OUTPATIENT
Start: 2018-06-07 | End: 2018-06-09 | Stop reason: HOSPADM

## 2018-06-04 RX ORDER — ONDANSETRON 2 MG/ML
INJECTION INTRAMUSCULAR; INTRAVENOUS PRN
Status: DISCONTINUED | OUTPATIENT
Start: 2018-06-04 | End: 2018-06-04

## 2018-06-04 RX ORDER — NALOXONE HYDROCHLORIDE 0.4 MG/ML
.1-.4 INJECTION, SOLUTION INTRAMUSCULAR; INTRAVENOUS; SUBCUTANEOUS
Status: DISCONTINUED | OUTPATIENT
Start: 2018-06-04 | End: 2018-06-04 | Stop reason: CLARIF

## 2018-06-04 RX ORDER — OXYCODONE HYDROCHLORIDE 5 MG/1
5-10 TABLET ORAL
Status: DISCONTINUED | OUTPATIENT
Start: 2018-06-04 | End: 2018-06-06

## 2018-06-04 RX ORDER — LIDOCAINE 40 MG/G
CREAM TOPICAL
Status: DISCONTINUED | OUTPATIENT
Start: 2018-06-04 | End: 2018-06-04

## 2018-06-04 RX ORDER — LIDOCAINE HYDROCHLORIDE AND EPINEPHRINE 15; 5 MG/ML; UG/ML
INJECTION, SOLUTION EPIDURAL PRN
Status: DISCONTINUED | OUTPATIENT
Start: 2018-06-04 | End: 2018-06-04 | Stop reason: HOSPADM

## 2018-06-04 RX ORDER — IBUPROFEN 400 MG/1
800 TABLET, FILM COATED ORAL EVERY 6 HOURS PRN
Status: DISCONTINUED | OUTPATIENT
Start: 2018-06-04 | End: 2018-06-09 | Stop reason: HOSPADM

## 2018-06-04 RX ORDER — LIDOCAINE HCL/EPINEPHRINE/PF 2%-1:200K
VIAL (ML) INJECTION PRN
Status: DISCONTINUED | OUTPATIENT
Start: 2018-06-04 | End: 2018-06-04

## 2018-06-04 RX ORDER — OXYTOCIN/0.9 % SODIUM CHLORIDE 30/500 ML
PLASTIC BAG, INJECTION (ML) INTRAVENOUS
Status: DISCONTINUED
Start: 2018-06-04 | End: 2018-06-04 | Stop reason: HOSPADM

## 2018-06-04 RX ORDER — KETOROLAC TROMETHAMINE 30 MG/ML
INJECTION, SOLUTION INTRAMUSCULAR; INTRAVENOUS
Status: COMPLETED
Start: 2018-06-04 | End: 2018-06-04

## 2018-06-04 RX ORDER — IBUPROFEN 400 MG/1
400 TABLET, FILM COATED ORAL EVERY 6 HOURS PRN
Status: DISCONTINUED | OUTPATIENT
Start: 2018-06-04 | End: 2018-06-09 | Stop reason: HOSPADM

## 2018-06-04 RX ORDER — DIPHENHYDRAMINE HCL 25 MG
25 CAPSULE ORAL EVERY 6 HOURS PRN
Status: DISCONTINUED | OUTPATIENT
Start: 2018-06-04 | End: 2018-06-09 | Stop reason: HOSPADM

## 2018-06-04 RX ORDER — GENTAMICIN SULFATE 100 MG/100ML
2 INJECTION, SOLUTION INTRAVENOUS ONCE
Status: DISCONTINUED | OUTPATIENT
Start: 2018-06-04 | End: 2018-06-04

## 2018-06-04 RX ORDER — IBUPROFEN 600 MG/1
600 TABLET, FILM COATED ORAL EVERY 6 HOURS PRN
Status: DISCONTINUED | OUTPATIENT
Start: 2018-06-04 | End: 2018-06-09 | Stop reason: HOSPADM

## 2018-06-04 RX ORDER — SIMETHICONE 80 MG
80 TABLET,CHEWABLE ORAL 4 TIMES DAILY PRN
Status: DISCONTINUED | OUTPATIENT
Start: 2018-06-04 | End: 2018-06-09 | Stop reason: HOSPADM

## 2018-06-04 RX ORDER — EPHEDRINE SULFATE 50 MG/ML
5 INJECTION, SOLUTION INTRAMUSCULAR; INTRAVENOUS; SUBCUTANEOUS
Status: DISCONTINUED | OUTPATIENT
Start: 2018-06-04 | End: 2018-06-04

## 2018-06-04 RX ORDER — LIDOCAINE HCL/EPINEPHRINE/PF 2%-1:200K
VIAL (ML) INJECTION
Status: DISCONTINUED
Start: 2018-06-04 | End: 2018-06-04 | Stop reason: HOSPADM

## 2018-06-04 RX ORDER — ACETAMINOPHEN 325 MG/1
TABLET ORAL
Status: DISCONTINUED
Start: 2018-06-04 | End: 2018-06-04 | Stop reason: HOSPADM

## 2018-06-04 RX ORDER — IBUPROFEN 400 MG/1
800 TABLET, FILM COATED ORAL
Status: DISCONTINUED | OUTPATIENT
Start: 2018-06-04 | End: 2018-06-04

## 2018-06-04 RX ORDER — OXYTOCIN/0.9 % SODIUM CHLORIDE 30/500 ML
PLASTIC BAG, INJECTION (ML) INTRAVENOUS CONTINUOUS PRN
Status: DISCONTINUED | OUTPATIENT
Start: 2018-06-04 | End: 2018-06-04

## 2018-06-04 RX ORDER — LIDOCAINE HYDROCHLORIDE AND EPINEPHRINE 15; 5 MG/ML; UG/ML
3 INJECTION, SOLUTION EPIDURAL
Status: DISCONTINUED | OUTPATIENT
Start: 2018-06-04 | End: 2018-06-04

## 2018-06-04 RX ORDER — NALOXONE HYDROCHLORIDE 0.4 MG/ML
.1-.4 INJECTION, SOLUTION INTRAMUSCULAR; INTRAVENOUS; SUBCUTANEOUS
Status: DISCONTINUED | OUTPATIENT
Start: 2018-06-04 | End: 2018-06-04

## 2018-06-04 RX ORDER — HYDROCORTISONE 2.5 %
CREAM (GRAM) TOPICAL 3 TIMES DAILY PRN
Status: DISCONTINUED | OUTPATIENT
Start: 2018-06-04 | End: 2018-06-09 | Stop reason: HOSPADM

## 2018-06-04 RX ORDER — OXYTOCIN 10 [USP'U]/ML
10 INJECTION, SOLUTION INTRAMUSCULAR; INTRAVENOUS
Status: DISCONTINUED | OUTPATIENT
Start: 2018-06-04 | End: 2018-06-04

## 2018-06-04 RX ORDER — BISACODYL 10 MG
10 SUPPOSITORY, RECTAL RECTAL DAILY PRN
Status: DISCONTINUED | OUTPATIENT
Start: 2018-06-06 | End: 2018-06-09 | Stop reason: HOSPADM

## 2018-06-04 RX ORDER — METHYLERGONOVINE MALEATE 0.2 MG/ML
200 INJECTION INTRAVENOUS
Status: DISCONTINUED | OUTPATIENT
Start: 2018-06-04 | End: 2018-06-04

## 2018-06-04 RX ORDER — LIDOCAINE 40 MG/G
CREAM TOPICAL
Status: DISCONTINUED | OUTPATIENT
Start: 2018-06-04 | End: 2018-06-09 | Stop reason: HOSPADM

## 2018-06-04 RX ORDER — OXYTOCIN/0.9 % SODIUM CHLORIDE 30/500 ML
100 PLASTIC BAG, INJECTION (ML) INTRAVENOUS CONTINUOUS
Status: DISCONTINUED | OUTPATIENT
Start: 2018-06-04 | End: 2018-06-09 | Stop reason: HOSPADM

## 2018-06-04 RX ORDER — MORPHINE SULFATE 1 MG/ML
2 INJECTION, SOLUTION EPIDURAL; INTRATHECAL; INTRAVENOUS ONCE
Status: DISCONTINUED | OUTPATIENT
Start: 2018-06-04 | End: 2018-06-04

## 2018-06-04 RX ORDER — FENTANYL CITRATE 50 UG/ML
100 INJECTION, SOLUTION INTRAMUSCULAR; INTRAVENOUS ONCE
Status: COMPLETED | OUTPATIENT
Start: 2018-06-04 | End: 2018-06-04

## 2018-06-04 RX ORDER — NALOXONE HYDROCHLORIDE 0.4 MG/ML
.1-.4 INJECTION, SOLUTION INTRAMUSCULAR; INTRAVENOUS; SUBCUTANEOUS
Status: DISCONTINUED | OUTPATIENT
Start: 2018-06-04 | End: 2018-06-09 | Stop reason: HOSPADM

## 2018-06-04 RX ORDER — PRENATAL VIT/IRON FUM/FOLIC AC 27MG-0.8MG
1 TABLET ORAL DAILY
Status: DISCONTINUED | OUTPATIENT
Start: 2018-06-04 | End: 2018-06-09 | Stop reason: HOSPADM

## 2018-06-04 RX ORDER — AMOXICILLIN 250 MG
2 CAPSULE ORAL 2 TIMES DAILY PRN
Status: DISCONTINUED | OUTPATIENT
Start: 2018-06-04 | End: 2018-06-09 | Stop reason: HOSPADM

## 2018-06-04 RX ORDER — DEXTROSE, SODIUM CHLORIDE, SODIUM LACTATE, POTASSIUM CHLORIDE, AND CALCIUM CHLORIDE 5; .6; .31; .03; .02 G/100ML; G/100ML; G/100ML; G/100ML; G/100ML
INJECTION, SOLUTION INTRAVENOUS CONTINUOUS
Status: DISCONTINUED | OUTPATIENT
Start: 2018-06-04 | End: 2018-06-06

## 2018-06-04 RX ORDER — NALBUPHINE HYDROCHLORIDE 10 MG/ML
2.5-5 INJECTION, SOLUTION INTRAMUSCULAR; INTRAVENOUS; SUBCUTANEOUS EVERY 6 HOURS PRN
Status: DISCONTINUED | OUTPATIENT
Start: 2018-06-04 | End: 2018-06-04

## 2018-06-04 RX ORDER — OXYCODONE AND ACETAMINOPHEN 5; 325 MG/1; MG/1
1 TABLET ORAL
Status: DISCONTINUED | OUTPATIENT
Start: 2018-06-04 | End: 2018-06-04

## 2018-06-04 RX ORDER — CEFAZOLIN SODIUM 1 G/3ML
1 INJECTION, POWDER, FOR SOLUTION INTRAMUSCULAR; INTRAVENOUS SEE ADMIN INSTRUCTIONS
Status: DISCONTINUED | OUTPATIENT
Start: 2018-06-04 | End: 2018-06-04

## 2018-06-04 RX ORDER — OXYTOCIN/0.9 % SODIUM CHLORIDE 30/500 ML
100-340 PLASTIC BAG, INJECTION (ML) INTRAVENOUS CONTINUOUS PRN
Status: DISCONTINUED | OUTPATIENT
Start: 2018-06-04 | End: 2018-06-04

## 2018-06-04 RX ORDER — ONDANSETRON 2 MG/ML
4 INJECTION INTRAMUSCULAR; INTRAVENOUS EVERY 6 HOURS PRN
Status: DISCONTINUED | OUTPATIENT
Start: 2018-06-04 | End: 2018-06-04

## 2018-06-04 RX ORDER — CITRIC ACID/SODIUM CITRATE 334-500MG
30 SOLUTION, ORAL ORAL
Status: DISCONTINUED | OUTPATIENT
Start: 2018-06-04 | End: 2018-06-04

## 2018-06-04 RX ORDER — OXYTOCIN/0.9 % SODIUM CHLORIDE 30/500 ML
340 PLASTIC BAG, INJECTION (ML) INTRAVENOUS CONTINUOUS PRN
Status: DISCONTINUED | OUTPATIENT
Start: 2018-06-04 | End: 2018-06-09 | Stop reason: HOSPADM

## 2018-06-04 RX ORDER — CLINDAMYCIN PHOSPHATE 900 MG/50ML
900 INJECTION, SOLUTION INTRAVENOUS EVERY 8 HOURS
Status: DISCONTINUED | OUTPATIENT
Start: 2018-06-04 | End: 2018-06-04

## 2018-06-04 RX ORDER — MISOPROSTOL 200 UG/1
400 TABLET ORAL
Status: DISCONTINUED | OUTPATIENT
Start: 2018-06-04 | End: 2018-06-09 | Stop reason: HOSPADM

## 2018-06-04 RX ORDER — KETOROLAC TROMETHAMINE 30 MG/ML
30 INJECTION, SOLUTION INTRAMUSCULAR; INTRAVENOUS EVERY 6 HOURS
Status: COMPLETED | OUTPATIENT
Start: 2018-06-04 | End: 2018-06-05

## 2018-06-04 RX ADMIN — LIDOCAINE HYDROCHLORIDE AND EPINEPHRINE 3 ML: 15; 5 INJECTION, SOLUTION EPIDURAL at 14:07

## 2018-06-04 RX ADMIN — MORPHINE SULFATE 2 MG: 1 INJECTION, SOLUTION EPIDURAL; INTRATHECAL; INTRAVENOUS at 18:04

## 2018-06-04 RX ADMIN — SODIUM CHLORIDE, POTASSIUM CHLORIDE, SODIUM LACTATE AND CALCIUM CHLORIDE: 600; 310; 30; 20 INJECTION, SOLUTION INTRAVENOUS at 17:16

## 2018-06-04 RX ADMIN — SODIUM CHLORIDE, POTASSIUM CHLORIDE, SODIUM LACTATE AND CALCIUM CHLORIDE 1000 ML: 600; 310; 30; 20 INJECTION, SOLUTION INTRAVENOUS at 13:44

## 2018-06-04 RX ADMIN — ROPIVACAINE HYDROCHLORIDE 9 ML: 2 INJECTION, SOLUTION EPIDURAL; INFILTRATION at 14:11

## 2018-06-04 RX ADMIN — CLINDAMYCIN PHOSPHATE 900 MG: 18 INJECTION, SOLUTION INTRAVENOUS at 10:58

## 2018-06-04 RX ADMIN — ACETAMINOPHEN 975 MG: 325 TABLET, FILM COATED ORAL at 19:03

## 2018-06-04 RX ADMIN — SODIUM CHLORIDE, POTASSIUM CHLORIDE, SODIUM LACTATE AND CALCIUM CHLORIDE 250 ML: 600; 310; 30; 20 INJECTION, SOLUTION INTRAVENOUS at 15:35

## 2018-06-04 RX ADMIN — GENTAMICIN SULFATE 100 MG: 100 INJECTION, SOLUTION INTRAVENOUS at 19:26

## 2018-06-04 RX ADMIN — PHENYLEPHRINE HYDROCHLORIDE 200 MCG: 10 INJECTION, SOLUTION INTRAMUSCULAR; INTRAVENOUS; SUBCUTANEOUS at 18:07

## 2018-06-04 RX ADMIN — LIDOCAINE HYDROCHLORIDE,EPINEPHRINE BITARTRATE 5 ML: 20; .005 INJECTION, SOLUTION EPIDURAL; INFILTRATION; INTRACAUDAL; PERINEURAL at 17:36

## 2018-06-04 RX ADMIN — LIDOCAINE HYDROCHLORIDE,EPINEPHRINE BITARTRATE 10 ML: 20; .005 INJECTION, SOLUTION EPIDURAL; INFILTRATION; INTRACAUDAL; PERINEURAL at 17:29

## 2018-06-04 RX ADMIN — ACETAMINOPHEN 975 MG: 325 TABLET ORAL at 19:03

## 2018-06-04 RX ADMIN — OXYTOCIN-SODIUM CHLORIDE 0.9% IV SOLN 30 UNIT/500ML 100 ML/HR: 30-0.9/5 SOLUTION at 21:47

## 2018-06-04 RX ADMIN — ACETAMINOPHEN 650 MG: 325 TABLET, FILM COATED ORAL at 00:42

## 2018-06-04 RX ADMIN — FLUOXETINE 10 MG: 10 CAPSULE ORAL at 07:02

## 2018-06-04 RX ADMIN — KETOROLAC TROMETHAMINE 30 MG: 30 INJECTION, SOLUTION INTRAMUSCULAR at 19:26

## 2018-06-04 RX ADMIN — OXYTOCIN-SODIUM CHLORIDE 0.9% IV SOLN 30 UNIT/500ML 1 MILLI-UNITS/MIN: 30-0.9/5 SOLUTION at 12:22

## 2018-06-04 RX ADMIN — PHENYLEPHRINE HYDROCHLORIDE 100 MCG: 10 INJECTION, SOLUTION INTRAMUSCULAR; INTRAVENOUS; SUBCUTANEOUS at 18:05

## 2018-06-04 RX ADMIN — SODIUM CHLORIDE, POTASSIUM CHLORIDE, SODIUM LACTATE AND CALCIUM CHLORIDE 500 ML: 600; 310; 30; 20 INJECTION, SOLUTION INTRAVENOUS at 16:20

## 2018-06-04 RX ADMIN — Medication 10 ML/HR: at 14:18

## 2018-06-04 RX ADMIN — CLINDAMYCIN PHOSPHATE 900 MG: 18 INJECTION, SOLUTION INTRAVENOUS at 18:23

## 2018-06-04 RX ADMIN — ONDANSETRON 4 MG: 2 INJECTION INTRAMUSCULAR; INTRAVENOUS at 17:37

## 2018-06-04 RX ADMIN — SODIUM CHLORIDE, POTASSIUM CHLORIDE, SODIUM LACTATE AND CALCIUM CHLORIDE: 600; 310; 30; 20 INJECTION, SOLUTION INTRAVENOUS at 10:49

## 2018-06-04 RX ADMIN — OXYTOCIN-SODIUM CHLORIDE 0.9% IV SOLN 30 UNIT/500ML 100 ML/HR: 30-0.9/5 SOLUTION at 18:57

## 2018-06-04 RX ADMIN — Medication 340 ML/HR: at 18:01

## 2018-06-04 RX ADMIN — FENTANYL CITRATE 100 MCG: 50 INJECTION, SOLUTION INTRAMUSCULAR; INTRAVENOUS at 14:11

## 2018-06-04 RX ADMIN — Medication 5 MG: at 14:15

## 2018-06-04 NOTE — H&P
Admitted:     2018      HISTORY:  33-year-old female,  2, para 1, who has been followed throughout her pregnancy in our clinic.  This is an IVF pregnancy and she is now 37 weeks and 4 days.  The baby was being followed by Maternal Fetal Medicine for a cardiac rhabdomyoma, but they felt that the patient was eligible for delivery at [facility].  The baby was found to have IUGR to the 7th percentile on a biophysical profile last week.  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  She has been followed by Maternal Fetal Medicine twice weekly with BPPs.      Over the weekend, she called our office with increased pedal edema and when she was brought in her pressures were somewhat elevated.  The protein creatinine ratio, in addition, was elevated.  The plan was to observe her in the hospital with a 24-hour urine; however, Maternal Fetal Medicine was consulted and recommended that we proceed with delivery of this patient.  I discussed the situation with the patient and her  and they agreed.        She was given Cleocin for positive group B strep, and membranes were ruptured artificially at 1:15 p.m.  She was then placed on Pitocin and began a contraction pattern; however, at about 3:00 p.m. the baby began to develop repetitive variable decelerations.  I evaluated the patient soon after and the cervix was still at 3 cm dilated, similar to the exam I had noted earlier in the day.  The patient has continued to have repetitive variable and early decelerations and I recommended to the patient and her  that we proceed with  section because of fetal intolerance to labor, and they agreed.      PAST OBSTETRICAL HISTORY:   x 1.      PAST MEDICAL HISTORY:  Depression and anxiety.        PAST SURGICAL HISTORY:  Negative.      ALLERGIES:  ALEVE AND AMOXICILLIN.      REVIEW OF SYSTEMS:  Otherwise negative.      PHYSICAL EXAMINATION:   VITAL SIGNS:  Stable.    NECK:  Supple, no masses.   BACK:  Normal spinal curvature, no CVA tenderness.   HEART:  S1, S2, no S3, S4, no murmur, regular rhythm.   CHEST:  Clear to auscultation.   ABDOMEN:  Contains a term intrauterine pregnancy in the vertex presentation.  Estimated fetal weight is 5 pounds.   PELVIC:  The cervix is 3 cm dilated.  70% effaced and -3 station.      IMPRESSION:  Intrauterine pregnancy 37 weeks and 4 days.  IVF pregnancy, intrauterine growth retardation, mild preeclampsia, fetal intolerance of labor.      PLAN:  Primary  section.  Risks and complications have been discussed.         ELEAZAR ALMANZAR MD             D: 2018   T: 2018   MT: CECY      Name:     VICTOR MANUEL HERRERA   MRN:      [id number]        Account:      [id number]   :      1985        Admitted:     2018                   Document: [id number]       cc: Angel BAILON

## 2018-06-04 NOTE — PLAN OF CARE
Patient and spouse offered to have the NNP in to see them and discuss possible infant needs, declined at this time.

## 2018-06-04 NOTE — H&P
34 yo  at 37+3 wks who has been checking BPs at home for the last week after having an elevation over baseline to 130/80 in the office with normal labs. In the office, she had new onset 2+ pedal edema.  She reports non-specifically not feeling well today and had two readings of 150/90. She denies change in edema.    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 Being followed by MFM twice weekly for BPPs. Delivery between 38-39 weeks had been advised unless other considerations arose. BPP on 2018 was 8/8 with normal RAYO and normal Dopplers.    PMH Ill Depression/anxiety   Surg None   Meds PNV daily    Prozac   All Aleve    SHx    Non smoker    PE BP: 125/78 Systolic (24hrs), Av , Min:125 , Max:141   Diastolic (24hrs), Av, Min:78, Max:88   Gen Well appearing   [place] Occasional   FHT Cat 1    Labs ALT 24   AST 35   Plt 184   Protein/Creatinine 6.61 (abnormal)    A/P 34 yo  who has had new onset pedal edema this week with ultrasound evidence of IUGR. She reported elevated BPs at home but BP trend here is not consistent with pre-eclampsia. However, her protein/creatinine ratio is elevated. Plan observation for serial blood pressures and 24 hour urine collection. Will consult with MFM in AM regarding plan.

## 2018-06-04 NOTE — PLAN OF CARE
Problem: Labor (Cervical Ripen, Induct, Augment) (Adult,Obstetrics,Pediatric)  Goal: Signs and Symptoms of Listed Potential Problems Will be Absent, Minimized or Managed (Labor)  Signs and symptoms of listed potential problems will be absent, minimized or managed by discharge/transition of care (reference Labor (Cervical Ripen, Induct, Augment) (Adult,Obstetrics,Pediatric) CPG).   Outcome: No Change  Induction of labor with pitocin. On IVAB for + GBS.

## 2018-06-04 NOTE — PLAN OF CARE
2226 pt arrives to MAC for BP monitoring and labs.  Pt to room, monitors applied with consent, assessment made, oriented to room.  Pt states she has a higher BP at home and has a slight headache that began this afternoon.  She has not taken tylenol for her pain.  Pt denies vag bleeding/LOF/epigastric pain/RUQ pain.  Urine clean catch collected and sent to lab.  2250 lab at bedside.  Waiting for lab results.  Pt and SO updated on current POC, both state understanding.  2319 Dr SITA Rivera paged  2324 Dr SITA Rivera updated via phone on but not limited to FHT, VS, labs.  POC to admit for a 24 hour urine and monitoring.  Dr Rivera transferred into pt room to speak with pt at 2328.  2335 RN to room, pt and SO have no further questions after speaking with Dr SITA Rivera.    2345 pt to room 231 via w/c.  Report to April ENRIQUEZ RN to assume care at this time.

## 2018-06-04 NOTE — ANESTHESIA CARE TRANSFER NOTE
Patient: Imelda Zepeda    Procedure(s):   - Wound Class: II-Clean Contaminated    Diagnosis: fetal intolerence  arrest of dilation  Diagnosis Additional Information: No value filed.    Anesthesia Type:   Epidural     Note:  Airway :Room Air  Patient transferred to:PACU        Vitals: (Last set prior to Anesthesia Care Transfer)    CRNA VITALS  6/4/2018 1800 - 6/4/2018 1837      6/4/2018             Resp Rate (set): 10                Electronically Signed By: LILLIE Elias CRNA  June 4, 2018  6:37 PM

## 2018-06-04 NOTE — PLAN OF CARE
1525: Received report from Kathleen CAMPBELL RN at this time.   1530: Pt repositioned into left lateral side for fetal decels noted on monitor. SVE 3/80/-2 per Kathleen CAMPBELL RN at this time.  1533: Oxygen started at 10 lpm via simple face mask for decels.   1535: 250 mL IV fluid bolus started and pt repositioned into right lateral side for recurrent decels.  1541: Pitocin infusion stopped, pt placed into high fowlers position, Dr. Alfonso paged and notified of recurrent decels on monitor.   1545: Dr. Alfonso to bedside to perform SVE for recurrent decels on monitor.  1550: Pt repositioned into left tilt position for decels.  1600: Dr. Alfonso remains at bedside and discussing plan for possible  with pt and pts spouse at this time.   1613: Pt repositioned into left modified blair position for decels. Pt remains on 10 lpm O2 via simple face mask.  1620: Pt repositioned into right modified blair position for decels. 500 mL IV fluid bolus given. Peanut ball placed at this time.  1626: Pt repositioned into semi-fowlers position with right tilt.  1633: Pt repositioned into left tilt position for decels, Dr. Alfonso to bedside at this time and updated on continued recurrent decels.   1642: Pt repositioned into right lateral side.  1648: Pt repositioned into right tilt semi-fowlers position for prolonged decel noted on monitor.   1700: Dr. Alfonso to bedside to discuss plan and ETA for  as well as update on continued recurrent decels.  1710: Report off given to Pramod HARLEY RN at this time for .

## 2018-06-04 NOTE — CONSULTS
Maternal Fetal Medicine    Consultation requested by Dr. Modesto Alfonso regarding management for patient with suspected preeclampsia.   I reviewed this patient's chart and discussed her case with Dr. Alfonso today (Imelda is well known to us as a patient) but did not see the patient, therefore a consultation is not billed.     In brief, patient is a 32 yo  at 37w4d based on IVF transfer. 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 Pregnancy also complicated by IUGR, with reassuring BPP and UA dopplers on 18. She now presents for admission in the early term setting, but > 37w0d with elevated BPs (since admission, has had 141/80, 142/83, 142/93, 149/83) and protein/creatinine ratio > 0.3 (is actually > 6.0). The remainder of her labs are normal, but she does meet criteria for preeclampsia without severe features at term.     US on  (last growth US) was 2159 grams, 7%ile. Last BPP  was 8/8    I recommend delivery based on diagnosis and gestational age. If preeclampsia remains without severe features, magnesium sulfate is not indicated. Pediatrics should be at delivery, and a  echo should be considered before discharge.     Recommendations reviewed with Dr. Weldon today by Dr. Ervin. Given P/C ratio and elevated BPs, 24 hour urine does not need to be completed prior to starting plans for delivery.     Please contact me with any questions,     Lizzy Ervin MD  [facility] [phone]   [phone]

## 2018-06-04 NOTE — PLAN OF CARE
Report taken from Josefina Colon RN.    POC per Dr. Rivera: Bedrest with bathroom privileges, 24 hour urine collection, vital signs every hour, monitor uterus/fetal HR each shift.  Settled patient into room.  Instructions given on urine collection, notification to nurse if any signs/symptoms of pre-eclampsia appear/worsen (complained of headache since 1200).   Questions answered, oriented to unit and call light.

## 2018-06-04 NOTE — PROGRESS NOTES
AROM performed at 1;15 pm, she has been on pitocin.  Recurrent variable and early decels, come down to 80's.  Pit turned off by nursing staff.  Cervix on my exam is 3/70/-3.  Type and screen done, watch for now.

## 2018-06-04 NOTE — PROVIDER NOTIFICATION
06/04/18 1200   Comments   Comments SVE per MD, unable to AROM due to infant ballottable, order received to start pitocin.

## 2018-06-04 NOTE — PROGRESS NOTES
Pressures have improved overnight, as has pedal edema.  MFM to see today for management recommendations.

## 2018-06-04 NOTE — PLAN OF CARE
Dr. Alfonso here to AROM patient.  Fetal station is still -3, MD decided to defer AROM at this time and gave orders to start oxytocin.

## 2018-06-04 NOTE — ANESTHESIA PROCEDURE NOTES
Peripheral nerve/Neuraxial procedure note : epidural catheter  Pre-Procedure  Performed by ELEAZAR DRISCOLL  Location: OB      Pre-Anesthestic Checklist: patient identified, IV checked, risks and benefits discussed, informed consent and pre-op evaluation    Timeout  Correct Patient: Yes   Correct Procedure: Yes   Correct Site: Yes   Correct Laterality: N/A   Correct Position: Yes   Site Marked: N/A   .   Procedure Documentation    .    Procedure:    Epidural catheter.  Insertion Site:L3-4  (midline approach) Injection technique: LORT saline   Local skin infiltrated with mL of 1% lidocaine.  CHEMA at 5 cm     Patient Prep;mask, sterile gloves, povidone-iodine 7.5% surgical scrub, patient draped.  .  Needle: ToLikeListy needle Needle Gauge: 17.    Needle Length (Inches) 3.5  # of attempts: 1 and # of redirects:  .   . .  Catheter threaded easily  4 cm epidural space.  9 cm at skin.   .    Assessment/Narrative  Paresthesias: No.  .  .  Aspiration negative for heme or CSF  . Test dose of 3 mL lidocaine 1.5% w/ 1:200,000 epinephrine at. Test dose negative for signs of intravascular, subdural or intrathecal injection. Comments:  Hart CHEMA at 5 cm.  Catheter threaded easily.  Negative aspiration.  Negative test dose.  No abnormal pain or paresthesia throughout.  Patient tolerated well.

## 2018-06-04 NOTE — ANESTHESIA PREPROCEDURE EVALUATION
Anesthesia Evaluation     .             ROS/MED HX    ENT/Pulmonary:       Neurologic:       Cardiovascular:  - neg cardiovascular ROS       METS/Exercise Tolerance:     Hematologic:         Musculoskeletal:         GI/Hepatic:         Renal/Genitourinary:         Endo:         Psychiatric:         Infectious Disease:         Malignancy:         Other:                     Physical Exam  Normal systems: cardiovascular, pulmonary and dental    Airway   Mallampati: II  TM distance: > 3 FB  Neck ROM: full  Mouth opening: > 3 cm    Dental     Cardiovascular       Pulmonary           neg OB ROS                 Anesthesia Plan      History & Physical Review  History and physical reviewed and following examination; no interval change.    ASA Status:  2 .  OB Epidural Asa: 2       Plan for Epidural          Postoperative Care      Consents  Anesthetic plan, risks, benefits and alternatives discussed with:  Patient and Patient..                          .

## 2018-06-04 NOTE — PLAN OF CARE
Dr lito VILLARREAL at bedside, consent for epidural signed by CHERELLE, Patient and RN. Patient sat up over side of bed, time out completed and Ropivacaine 10cc vial and fentanyl 2cc vial handed off to CHERELLE

## 2018-06-04 NOTE — PROVIDER NOTIFICATION
06/04/18 1408   Comments   Comments Epidural cath taped in place and dosed per MDA. Patient to LT and dinamapp set for q2mimn.

## 2018-06-04 NOTE — ANESTHESIA PREPROCEDURE EVALUATION
Anesthesia Evaluation     . Pt has not had prior anesthetic            ROS/MED HX    ENT/Pulmonary:  - neg pulmonary ROS    (-) sleep apnea   Neurologic:  - neg neurologic ROS     Cardiovascular: Comment: Likely Pre-eclampsia        METS/Exercise Tolerance:     Hematologic:  - neg hematologic  ROS       Musculoskeletal:  - neg musculoskeletal ROS       GI/Hepatic:  - neg GI/hepatic ROS      (-) GERD   Renal/Genitourinary:  - ROS Renal section negative       Endo:  - neg endo ROS       Psychiatric:     (+) psychiatric history depression      Infectious Disease:  - neg infectious disease ROS       Malignancy:         Other:                     Physical Exam  Normal systems: dental    Airway   Mallampati: III  TM distance: >3 FB  Neck ROM: full    Dental     Cardiovascular   Rhythm and rate: regular      Pulmonary    breath sounds clear to auscultation                    Anesthesia Plan      History & Physical Review  History and physical reviewed and following examination; no interval change.    ASA Status:  3 emergent.    NPO Status:  Full stomach and > 4 hours    Plan for Epidural   PONV prophylaxis:  Ondansetron (or other 5HT-3)  Likely pre-eclampsia, platelets 179    Epidural working well    Duramorph for postoperative analgesia      Postoperative Care  Postoperative pain management:  Neuraxial analgesia.      Consents  Anesthetic plan, risks, benefits and alternatives discussed with:  Patient.  Use of blood products discussed: Yes.   Use of blood products discussed with Patient.  Consented to blood products.  .        Procedure: Procedure(s):   SECTION  Preop diagnosis: fetal intolerence  arrest of dilation    Allergies   Allergen Reactions     Aleve [Naproxen] Other (See Comments) and Cough     Wheezing, swelling around eyes. Patient states she takes motrin / Ibuprofen at home     Amoxicillin Rash     Past Medical History:   Diagnosis Date     Depression     As a teenager     Hx of previous  reproductive problem     IVF     Vaginal delivery 10/19/2014     History reviewed. No pertinent surgical history.  Social History   Substance Use Topics     Smoking status: Never Smoker     Smokeless tobacco: Never Used     Alcohol use No     Prior to Admission medications    Medication Sig Start Date End Date Taking? Authorizing Provider   calcium carbonate (TUMS) 500 MG chewable tablet Take 2 chew tab by mouth 2 times daily   Yes Reported, Patient   FLUoxetine HCl (PROZAC PO) Take 10 mg by mouth daily   Yes Reported, Patient   Prenatal Vit-Fe Fumarate-FA (PRENATAL MULTIVITAMIN  PLUS IRON) 27-0.8 MG TABS Take 1 tablet by mouth daily   Yes Reported, Patient     Current Facility-Administered Medications Ordered in Epic   Medication Dose Route Frequency Last Rate Last Dose     acetaminophen (TYLENOL) tablet 650 mg  650 mg Oral Q4H PRN   650 mg at 06/04/18 0042     acetaminophen (TYLENOL) tablet 650 mg  650 mg Oral Q4H PRN         carboprost (HEMABATE) injection 250 mcg  250 mcg Intramuscular Once PRN         clindamycin (CLEOCIN) infusion 900 mg  900 mg Intravenous Q8H 50 mL/hr at 06/04/18 1058 900 mg at 06/04/18 1058     ePHEDrine injection 5 mg  5 mg Intravenous Q3 Min PRN   5 mg at 06/04/18 1415     fentaNYL (SUBLIMAZE) 2 mcg/mL, ropivacaine (NAROPIN) 0.2% in NaCl 0.9% EPIDURAL infusion   EPIDURAL Continuous 10 mL/hr at 06/04/18 1418 10 mL/hr at 06/04/18 1418     FLUoxetine (PROzac) capsule 10 mg  10 mg Oral Daily   10 mg at 06/04/18 0702     ibuprofen (ADVIL/MOTRIN) tablet 800 mg  800 mg Oral Once PRN         lactated ringers BOLUS 1,000 mL  1,000 mL Intravenous Once         lactated ringers BOLUS 500 mL  500 mL Intravenous Once PRN         lactated ringers infusion   Intravenous Continuous 125 mL/hr at 06/04/18 1049       lidocaine (LMX4) cream   Topical Q1H PRN         lidocaine 1 % 0.1-20 mL  0.1-20 mL Subcutaneous Once PRN         lidocaine 1 % 1 mL  1 mL Other Q1H PRN         lidocaine-EPINEPHrine 1.5  "%-1:200000 injection 3 mL  3 mL EPIDURAL Once PRN         lidocaine-EPINEPHrine 1.5 %-1:580964 injection    PRN   3 mL at 06/04/18 1407     medication instruction   Does not apply Continuous PRN         medication instruction   Does not apply Continuous PRN         Medication Instructions: misoprostol (CYTOTEC)- Nurse to discuss ordering with provider, if needed. Ordered via \"OB misoprostol (CYTOTEC) Postpartum Hemorrhage PANEL\"   Does not apply Continuous PRN         methylergonovine (METHERGINE) injection 200 mcg  200 mcg Intramuscular Once PRN         nalbuphine (NUBAIN) injection 2.5-5 mg  2.5-5 mg Intravenous Q6H PRN         naloxone (NARCAN) injection 0.1-0.4 mg  0.1-0.4 mg Intravenous Q2 Min PRN         naloxone (NARCAN) injection 0.1-0.4 mg  0.1-0.4 mg Intravenous Q2 Min PRN         ondansetron (ZOFRAN) injection 4 mg  4 mg Intravenous Q6H PRN         Opioid plan postpartum - medication instruction   Does not apply Continuous PRN         oxyCODONE-acetaminophen (PERCOCET) 5-325 MG per tablet 1 tablet  1 tablet Oral Once PRN         oxytocin (PITOCIN) 30 units in 500 mL 0.9% NaCl infusion  100-340 mL/hr Intravenous Continuous PRN         oxytocin (PITOCIN) 30 units in 500 mL 0.9% NaCl infusion  1-24 philip-units/min Intravenous Continuous   Stopped at 06/04/18 1541     oxytocin (PITOCIN) injection 10 Units  10 Units Intramuscular Once PRN         sodium chloride (PF) 0.9% PF flush 3 mL  3 mL Intracatheter Q1H PRN         sodium chloride (PF) 0.9% PF flush 3 mL  3 mL Intracatheter Q8H         No current Epic-ordered outpatient prescriptions on file.       fentaNYL-ropivacaine 10 mL/hr (06/04/18 1418)     lactated ringers 125 mL/hr at 06/04/18 1049     - MEDICATION INSTRUCTIONS -       - MEDICATION INSTRUCTIONS -       - MEDICATION INSTRUCTIONS -       - MEDICATION INSTRUCTIONS -       oxytocin in 0.9% NaCl       oxytocin in 0.9% NaCl Stopped (06/04/18 1541)     Wt Readings from Last 1 Encounters:   06/03/18 " 66.2 kg (146 lb)     Temp Readings from Last 1 Encounters:   06/04/18 37.2  C (99  F) (Temporal)     BP Readings from Last 6 Encounters:   06/04/18 122/74   10/19/14 125/75     Pulse Readings from Last 4 Encounters:   No data found for Pulse     Resp Readings from Last 1 Encounters:   06/04/18 18     SpO2 Readings from Last 1 Encounters:   06/04/18 95%     Recent Labs   Lab Test  06/03/18   2250   NA  129*   POTASSIUM  4.2   CHLORIDE  95   CO2  25   ANIONGAP  9   GLC  76   BUN  19   CR  0.69   CAIT  7.8*     Recent Labs   Lab Test  06/04/18   0850  06/03/18   2250   AST  31  35   ALT  22  24     Recent Labs   Lab Test  06/04/18   1110  06/04/18   0850  06/03/18   2250   WBC  7.9   --   10.2   HGB  12.0   --   11.5*   PLT  179  172  184     Recent Labs   Lab Test  06/04/18   1110   ABO  O   RH  Pos     RECENT LABS:   ECG:   ECHO:

## 2018-06-05 LAB
ALBUMIN SERPL-MCNC: 1.6 G/DL (ref 3.4–5)
ALP SERPL-CCNC: 130 U/L (ref 40–150)
ALT SERPL W P-5'-P-CCNC: 23 U/L (ref 0–50)
ANION GAP SERPL CALCULATED.3IONS-SCNC: 6 MMOL/L (ref 3–14)
AST SERPL W P-5'-P-CCNC: 38 U/L (ref 0–45)
BILIRUB SERPL-MCNC: 0.2 MG/DL (ref 0.2–1.3)
BUN SERPL-MCNC: 13 MG/DL (ref 7–30)
CALCIUM SERPL-MCNC: 7.5 MG/DL (ref 8.5–10.1)
CHLORIDE SERPL-SCNC: 100 MMOL/L (ref 94–109)
CO2 SERPL-SCNC: 26 MMOL/L (ref 20–32)
CREAT SERPL-MCNC: 0.73 MG/DL (ref 0.52–1.04)
GFR SERPL CREATININE-BSD FRML MDRD: >90 ML/MIN/1.7M2
GLUCOSE SERPL-MCNC: 102 MG/DL (ref 70–99)
HGB BLD-MCNC: 9.7 G/DL (ref 11.7–15.7)
POTASSIUM SERPL-SCNC: 4.1 MMOL/L (ref 3.4–5.3)
PROT SERPL-MCNC: 4.7 G/DL (ref 6.8–8.8)
SODIUM SERPL-SCNC: 132 MMOL/L (ref 133–144)
T PALLIDUM AB SER QL: NONREACTIVE

## 2018-06-05 PROCEDURE — 85018 HEMOGLOBIN: CPT | Performed by: OBSTETRICS & GYNECOLOGY

## 2018-06-05 PROCEDURE — 12000037 ZZH R&B POSTPARTUM INTERMEDIATE

## 2018-06-05 PROCEDURE — 80053 COMPREHEN METABOLIC PANEL: CPT | Performed by: OBSTETRICS & GYNECOLOGY

## 2018-06-05 PROCEDURE — 25000128 H RX IP 250 OP 636: Performed by: OBSTETRICS & GYNECOLOGY

## 2018-06-05 PROCEDURE — 36415 COLL VENOUS BLD VENIPUNCTURE: CPT | Performed by: OBSTETRICS & GYNECOLOGY

## 2018-06-05 PROCEDURE — 25000132 ZZH RX MED GY IP 250 OP 250 PS 637: Performed by: OBSTETRICS & GYNECOLOGY

## 2018-06-05 RX ORDER — FLUOXETINE 10 MG/1
10 CAPSULE ORAL DAILY
Status: DISCONTINUED | OUTPATIENT
Start: 2018-06-05 | End: 2018-06-09 | Stop reason: HOSPADM

## 2018-06-05 RX ADMIN — DIPHENHYDRAMINE HYDROCHLORIDE 25 MG: 25 CAPSULE ORAL at 06:11

## 2018-06-05 RX ADMIN — OXYCODONE HYDROCHLORIDE 5 MG: 5 TABLET ORAL at 22:21

## 2018-06-05 RX ADMIN — IBUPROFEN 800 MG: 400 TABLET ORAL at 19:29

## 2018-06-05 RX ADMIN — FLUOXETINE 10 MG: 10 CAPSULE ORAL at 09:27

## 2018-06-05 RX ADMIN — ACETAMINOPHEN 975 MG: 325 TABLET, FILM COATED ORAL at 02:53

## 2018-06-05 RX ADMIN — KETOROLAC TROMETHAMINE 30 MG: 30 INJECTION, SOLUTION INTRAMUSCULAR at 01:13

## 2018-06-05 RX ADMIN — SENNOSIDES AND DOCUSATE SODIUM 1 TABLET: 8.6; 5 TABLET ORAL at 19:29

## 2018-06-05 RX ADMIN — KETOROLAC TROMETHAMINE 30 MG: 30 INJECTION, SOLUTION INTRAMUSCULAR at 13:22

## 2018-06-05 RX ADMIN — SENNOSIDES AND DOCUSATE SODIUM 1 TABLET: 8.6; 5 TABLET ORAL at 07:57

## 2018-06-05 RX ADMIN — DIPHENHYDRAMINE HYDROCHLORIDE 25 MG: 25 CAPSULE ORAL at 00:01

## 2018-06-05 RX ADMIN — SODIUM CHLORIDE, SODIUM LACTATE, POTASSIUM CHLORIDE, CALCIUM CHLORIDE AND DEXTROSE MONOHYDRATE: 5; 600; 310; 30; 20 INJECTION, SOLUTION INTRAVENOUS at 02:52

## 2018-06-05 RX ADMIN — ACETAMINOPHEN 975 MG: 325 TABLET, FILM COATED ORAL at 11:25

## 2018-06-05 RX ADMIN — ACETAMINOPHEN 975 MG: 325 TABLET, FILM COATED ORAL at 19:29

## 2018-06-05 RX ADMIN — PRENATAL VIT W/ FE FUMARATE-FA TAB 27-0.8 MG 1 TABLET: 27-0.8 TAB at 07:57

## 2018-06-05 RX ADMIN — KETOROLAC TROMETHAMINE 30 MG: 30 INJECTION, SOLUTION INTRAMUSCULAR at 07:12

## 2018-06-05 NOTE — PROGRESS NOTES
Called Dr. Tory Rivera because patient wanted her prozac reordered. Notified MD that patient's BP mainly 140's/90's overnight. No new orders at this time. MD stated to call for BP greater than 150/100.

## 2018-06-05 NOTE — ANESTHESIA POSTPROCEDURE EVALUATION
Patient: Imelda Zepeda    Procedure(s):   - Wound Class: II-Clean Contaminated    Diagnosis:fetal intolerence  arrest of dilation  Diagnosis Additional Information: No value filed.    Anesthesia Type:  Epidural    Note:  Anesthesia Post Evaluation    Patient location during evaluation: Bedside  Patient participation: Able to fully participate in evaluation  Level of consciousness: awake and alert  Pain management: adequate  Airway patency: patent  Cardiovascular status: acceptable  Respiratory status: acceptable  Hydration status: acceptable  PONV: none       Comments: Epidural removed with tip intact.  No issues postoperatively.  No headache in PACU, patient able to move lower extremities bilaterally.         Last vitals:  Vitals:    06/04/18 1945 06/04/18 2000 06/04/18 2015   BP: 120/71 138/85 (!) 133/96   Resp: 17 23 13   Temp:      SpO2: 99% 100%          Electronically Signed By: Shad Montana MD  June 4, 2018  8:41 PM

## 2018-06-05 NOTE — PLAN OF CARE
Assisted Pt up to bathroom.  Pt did well.  Pt denied feeling any dizziness when up.  Pt requested to have dennison remove.  Discontinued dennison without any difficulty.  Cayla cares performed.  Pt walked back to bed with stand by assisted.  Continues to monitor Pt.

## 2018-06-05 NOTE — OP NOTE
Procedure Date: 2018      PREOPERATIVE DIAGNOSES:  37 week gestation, mild preeclampsia, fetal intolerance to labor, intrauterine growth restriction.      POSTOPERATIVE DIAGNOSES:  37 week gestation, mild preeclampsia, fetal intolerance to labor, intrauterine growth restriction.      PROCEDURE PERFORMED:  Primary low transverse  section.      SURGEON:  Tory Rivera MD      ANESTHESIA:  Epidural.      ESTIMATED BLOOD LOSS:  500 mL.      FINDINGS:  Live-born female infant, weight was 5 pounds, 8 ounces, Apgars were 8 and 9 at 1 and 5 minutes respectively.  The uterus, tubes and ovaries appeared normal.      HISTORY:  The patient is a 33-year-old  2, para 1, who presented to Labor and Delivery after checking her blood pressures at home and was noted to have elevated blood pressures up to 150s/90s.  Her pregnancy was complicated by findings of a cardiac rhabdomyoma on ultrasound for which she was followed very closely by Maternal Fetal Medicine.  The baby also demonstrated intrauterine growth restriction at 36 weeks.  On presentation to Labor and Delivery, her blood pressures remained elevated.  Her preclampsia labs were normal.  She was admitted to Labor and Delivery for further evaluation.  Her blood pressures continued to be elevated and biophysical profile was performed with a score of 8/8.  At this time, she was advised to undergo induction of labor at 37-4/7 weeks gestation due to mild preeclampsia.  She was started on Pitocin.  Amniotomy was eventually performed.  She received an epidural for pain control.  Early in labor, she was found to be 3 cm dilated, 70% effaced with a -3 station.  Fetal heart tones demonstrated recurrent variable and early decelerations requiring the Pitocin to be discontinued.  At this time, her cervix was not changing rapidly and it was felt that the growth restriction was causing fetal intolerance to labor.  She was advised at this time to proceed with   section.  Risks, benefits and alternatives were discussed.      DESCRIPTION OF PROCEDURE:  After obtaining informed consent, the patient was taken to the operating room where she was prepped and draped in the usual sterile fashion and placed in the dorsal supine position with a leftward tilt.  A Pfannenstiel skin incision was made with a scalpel and carried through the fascia.  The fascial incision was extended laterally with Arroyo scissors.  The rectus muscles were cauterized to separate them from fascia, then  in midline and the peritoneum was entered sharply.  The peritoneal incision was extended with sharp dissection.  The vesicouterine peritoneum was entered and a bladder flap was created.  A transverse lower uterine segment smile incision was made with a scalpel until clear amniotic fluid was noted.  The uterine incision was gently extended using traction in the cephalocaudal direction.  The fetal vertex was noted to be quite low in the pelvis and molded.  Initially, the head was engaged and difficult to elevate.  After the second attempt, the fetal vertex was elevated out of the incision and it was delivered through the incision using gentle fundal pressure.  There was no nuchal cord noted.  The anterior and posterior shoulders delivered easily and the remainder of the body followed spontaneously.  The umbilical cord was noted to be wrapped around the baby's right ankle.  Cord clamping was delayed by 1 minute, at which time the baby was vigorous and crying.  The cord was then doubly clamped and cut and transferred to the warmer.  The placenta was expressed and found to be intact with a 3-vessel cord.  The uterine incision was reapproximated with 0 Vicryl in a continuous locked fashion.  A second imbricating layer was placed with 0 Vicryl to achieve hemostasis.  There was some oozing from the left angle of the incision and this was oversewn with 0 Vicryl in a figure-of-eight fashion.  The gutters were  cleared of all clots and debris, and the tubes and ovaries were inspected and appeared normal.  The uterine incision was reinspected and noted to be hemostatic.  The subfascial layers were inspected and noted to be hemostatic.  At this time, there was noted to be a pink tinge with clots in the Ruiz catheter.  The bladder was carefully inspected and appeared intact.  The Ruiz bulb was palpated low in the bladder.  The fascia was then closed with 0 Vicryl in 2 running lengths.  The subcutaneous layer was irrigated and made hemostatic with cautery.  The skin was then closed with Insorb staples.  The Ruiz catheter appeared clearing at this time with no active bleeding.  The patient tolerated the procedure without difficulty and she was taken to her recovery room in stable condition.  Sponge, lap and needle counts were correct.         ROSALVA ZARATE MD             D: 2018   T: 2018   MT: RIVERA      Name:     VICTOR MANUEL HERRERA   MRN:      [id number]        Account:        [id number]   :      1985           Procedure Date: 2018      Document: [id number]

## 2018-06-05 NOTE — LACTATION NOTE
Initial visit.  Imelda plans to bottle feed.  She is breast feeding initially to get baby the colostrum and is giving formula by bottle.  Reviewed with her that milk will come in and breast care to suppress it.  Discussed preventing mastitis and importance of a tight supportive bra and outpatient lactation resources if having any needs.  Imelda and her  both stated they had a plan and were clear they did not need any further lactation services or have any questions.  Explained lactation hours and availability if they had any concerns or questions during their stay.    Rosemary Choi  RN, IBCLC

## 2018-06-05 NOTE — PROGRESS NOTES
"June 5, 2018      SUBJECTIVE: No acute overnight events.  Pain adequately controlled.  +Lochia moderate.  Tolerating PO.  No flatus yet.  Dennison catheter still in.  Denies CP/palp/SOB/LH.  Denies HA/visual changes or RUQ/epigastric pain.    OBJECTIVE: BP (!) 138/96  Temp 98.8  F (37.1  C) (Oral)  Resp 16  Ht 1.537 m (5' 0.5\")  Wt 66.2 kg (146 lb)  SpO2 99%  Breastfeeding? Unknown  BMI 28.04 kg/m2  Gen: NAD, A&O x3  Abd: soft.  Incision C/D/I, no active bleeding.  No erythema, induration, or discharge.  Minimal dried blood under tegaderm.  Ext: moderate edema BL LEs, symmetric, no CT    Hemoglobin   Date Value Ref Range Status   06/04/2018 12.0 11.7 - 15.7 g/dL Final   06/03/2018 11.5 (L) 11.7 - 15.7 g/dL Final   ]    A/P: POD#1 s/p primary LTCS for fetal intolerance to labor after IOL for pre-eclampsia and IUGR.  Doing well.  Afebrile, VSS.  - toradol/oxycodone/tylenol PRN pain  - pre-eclampsia: BPs mildly elevated but no severe range BPs.  Pt remains asymptomatic at this time.  [facility] labs were wnl yesterday post-op.  Continue to monitor.  If BP persistently >150s/100s consider PO antihypertensive.  Sx reviewed.  No MgSO4 at this time.  - hyponatremia: Na on 6/3/18 was 129, will repeat Na level this AM.  Pt asymptomatic.  - UOP adequate - d/c dennison later today  - regular diet as tolerated  - breastfeeding  - encouraged ambulation today  - routine post-op care        CHLOÉ MILAN MD    "

## 2018-06-05 NOTE — PROGRESS NOTES
June 5, 2018        Labs reveal Na improved at 132 mmol/L (was previously 129).  Pt remains asymptomatic.  Will repeat BMP in AM, no need for further treatment at this time.  Expect improvement as pt diureses with resolution of pre-eclampsia. If <130 or if pt becomes symptomatic then may need treatment (i.e. hypertonic saline).          CHLOÉ MILAN MD

## 2018-06-05 NOTE — PLAN OF CARE
Data: Imelda Zepeda transferred to postpartum room 402 via wheelchair at 2045. Baby transferred via parent's arms.  Action: Receiving unit notified of transfer: Yes. Patient and family notified of room change. Report given to KIRBY Heard RN at 2055. Belongings sent to receiving unit. Accompanied by Registered Nurse. Oriented patient to surroundings. Call light within reach. ID bands double-checked with receiving RN.  Response: Patient tolerated transfer and is stable.

## 2018-06-05 NOTE — PLAN OF CARE
Notified Dr. David regarding Pt's lab results, but MD is seeing Pt at this time.  Left message to Liz () for Dr. David to call back.  Continues to monitor Pt.

## 2018-06-05 NOTE — PLAN OF CARE
Problem: Patient Care Overview  Goal: Plan of Care/Patient Progress Review  Outcome: Improving  Pain is under control with durmorph, tylenol, and toradol.  Pt tolerated regular diet well.  Bonding well with baby.   is supportive and helpful at bed side.  Pt and her  are comfortable with baby cares.  Pt stated that the plan is to bottle feed baby  but want to breastfeed baby here in the hospital.  Pt declined to start pumping.  Pt declined feeling headache, blurry vision, nausea, and/or epigastric pain.  Continues to monitor Pt.

## 2018-06-05 NOTE — PLAN OF CARE
Problem: Postpartum ( Delivery) (Adult,Obstetrics,Pediatric)  Goal: Signs and Symptoms of Listed Potential Problems Will be Absent, Minimized or Managed (Postpartum)  Signs and symptoms of listed potential problems will be absent, minimized or managed by discharge/transition of care (reference Postpartum ( Delivery) (Adult,Obstetrics,Pediatric) [facility]).   Outcome: No Change  VSS, 's/90's. Pain controlled with toradol and tylenol. Incision intact with small amount of serosanguinous drainage. Ambulated to bathroom. Abdominal binder on. Good urine output in dennison. 3+ lower extremity edema. Breast and bottle feeding.

## 2018-06-06 LAB
ALT SERPL W P-5'-P-CCNC: 29 U/L (ref 0–50)
ANION GAP SERPL CALCULATED.3IONS-SCNC: 9 MMOL/L (ref 3–14)
AST SERPL W P-5'-P-CCNC: 40 U/L (ref 0–45)
BASOPHILS # BLD AUTO: 0 10E9/L (ref 0–0.2)
BASOPHILS NFR BLD AUTO: 0.3 %
BUN SERPL-MCNC: 15 MG/DL (ref 7–30)
CALCIUM SERPL-MCNC: 7.7 MG/DL (ref 8.5–10.1)
CHLORIDE SERPL-SCNC: 104 MMOL/L (ref 94–109)
CO2 SERPL-SCNC: 24 MMOL/L (ref 20–32)
CREAT SERPL-MCNC: 0.73 MG/DL (ref 0.52–1.04)
CREAT SERPL-MCNC: 0.73 MG/DL (ref 0.52–1.04)
DIFFERENTIAL METHOD BLD: ABNORMAL
EOSINOPHIL # BLD AUTO: 0.3 10E9/L (ref 0–0.7)
EOSINOPHIL NFR BLD AUTO: 2.5 %
ERYTHROCYTE [DISTWIDTH] IN BLOOD BY AUTOMATED COUNT: 12.8 % (ref 10–15)
GFR SERPL CREATININE-BSD FRML MDRD: >90 ML/MIN/1.7M2
GFR SERPL CREATININE-BSD FRML MDRD: >90 ML/MIN/1.7M2
GLUCOSE SERPL-MCNC: 130 MG/DL (ref 70–99)
HCT VFR BLD AUTO: 26.2 % (ref 35–47)
HGB BLD-MCNC: 9 G/DL (ref 11.7–15.7)
IMM GRANULOCYTES # BLD: 0.1 10E9/L (ref 0–0.4)
IMM GRANULOCYTES NFR BLD: 0.7 %
LYMPHOCYTES # BLD AUTO: 3 10E9/L (ref 0.8–5.3)
LYMPHOCYTES NFR BLD AUTO: 29.7 %
MCH RBC QN AUTO: 32.1 PG (ref 26.5–33)
MCHC RBC AUTO-ENTMCNC: 34.4 G/DL (ref 31.5–36.5)
MCV RBC AUTO: 94 FL (ref 78–100)
MONOCYTES # BLD AUTO: 0.8 10E9/L (ref 0–1.3)
MONOCYTES NFR BLD AUTO: 7.8 %
NEUTROPHILS # BLD AUTO: 5.9 10E9/L (ref 1.6–8.3)
NEUTROPHILS NFR BLD AUTO: 59 %
NRBC # BLD AUTO: 0 10*3/UL
NRBC BLD AUTO-RTO: 0 /100
PLATELET # BLD AUTO: 187 10E9/L (ref 150–450)
POTASSIUM SERPL-SCNC: 4.3 MMOL/L (ref 3.4–5.3)
RBC # BLD AUTO: 2.8 10E12/L (ref 3.8–5.2)
SODIUM SERPL-SCNC: 137 MMOL/L (ref 133–144)
WBC # BLD AUTO: 10 10E9/L (ref 4–11)

## 2018-06-06 PROCEDURE — 93005 ELECTROCARDIOGRAM TRACING: CPT

## 2018-06-06 PROCEDURE — 80048 BASIC METABOLIC PNL TOTAL CA: CPT | Performed by: OBSTETRICS & GYNECOLOGY

## 2018-06-06 PROCEDURE — 25000132 ZZH RX MED GY IP 250 OP 250 PS 637: Performed by: OBSTETRICS & GYNECOLOGY

## 2018-06-06 PROCEDURE — 36415 COLL VENOUS BLD VENIPUNCTURE: CPT | Performed by: OBSTETRICS & GYNECOLOGY

## 2018-06-06 PROCEDURE — 84460 ALANINE AMINO (ALT) (SGPT): CPT | Performed by: OBSTETRICS & GYNECOLOGY

## 2018-06-06 PROCEDURE — 84450 TRANSFERASE (AST) (SGOT): CPT | Performed by: OBSTETRICS & GYNECOLOGY

## 2018-06-06 PROCEDURE — 12000037 ZZH R&B POSTPARTUM INTERMEDIATE

## 2018-06-06 PROCEDURE — 93010 ELECTROCARDIOGRAM REPORT: CPT | Performed by: INTERNAL MEDICINE

## 2018-06-06 PROCEDURE — 85025 COMPLETE CBC W/AUTO DIFF WBC: CPT | Performed by: OBSTETRICS & GYNECOLOGY

## 2018-06-06 PROCEDURE — 82565 ASSAY OF CREATININE: CPT | Performed by: OBSTETRICS & GYNECOLOGY

## 2018-06-06 RX ORDER — HYDROMORPHONE HYDROCHLORIDE 2 MG/1
2 TABLET ORAL
Status: DISCONTINUED | OUTPATIENT
Start: 2018-06-06 | End: 2018-06-09 | Stop reason: HOSPADM

## 2018-06-06 RX ORDER — NIFEDIPINE 30 MG/1
30 TABLET, EXTENDED RELEASE ORAL DAILY
Status: DISCONTINUED | OUTPATIENT
Start: 2018-06-06 | End: 2018-06-09 | Stop reason: HOSPADM

## 2018-06-06 RX ORDER — NIFEDIPINE 10 MG/1
20 CAPSULE ORAL ONCE
Status: COMPLETED | OUTPATIENT
Start: 2018-06-06 | End: 2018-06-06

## 2018-06-06 RX ADMIN — IBUPROFEN 800 MG: 400 TABLET ORAL at 13:51

## 2018-06-06 RX ADMIN — IBUPROFEN 800 MG: 400 TABLET ORAL at 08:00

## 2018-06-06 RX ADMIN — HYDROMORPHONE HYDROCHLORIDE 2 MG: 2 TABLET ORAL at 15:19

## 2018-06-06 RX ADMIN — HYDROMORPHONE HYDROCHLORIDE 2 MG: 2 TABLET ORAL at 20:09

## 2018-06-06 RX ADMIN — PRENATAL VIT W/ FE FUMARATE-FA TAB 27-0.8 MG 1 TABLET: 27-0.8 TAB at 08:02

## 2018-06-06 RX ADMIN — HYDROMORPHONE HYDROCHLORIDE 2 MG: 2 TABLET ORAL at 11:45

## 2018-06-06 RX ADMIN — NIFEDIPINE 30 MG: 30 TABLET, FILM COATED, EXTENDED RELEASE ORAL at 17:13

## 2018-06-06 RX ADMIN — ACETAMINOPHEN 975 MG: 325 TABLET, FILM COATED ORAL at 19:25

## 2018-06-06 RX ADMIN — NIFEDIPINE 20 MG: 10 CAPSULE ORAL at 22:55

## 2018-06-06 RX ADMIN — ACETAMINOPHEN 975 MG: 325 TABLET, FILM COATED ORAL at 03:40

## 2018-06-06 RX ADMIN — SENNOSIDES AND DOCUSATE SODIUM 1 TABLET: 8.6; 5 TABLET ORAL at 08:01

## 2018-06-06 RX ADMIN — ACETAMINOPHEN 975 MG: 325 TABLET, FILM COATED ORAL at 10:50

## 2018-06-06 RX ADMIN — IBUPROFEN 800 MG: 400 TABLET ORAL at 19:25

## 2018-06-06 RX ADMIN — IBUPROFEN 800 MG: 400 TABLET ORAL at 01:23

## 2018-06-06 RX ADMIN — FLUOXETINE 10 MG: 10 CAPSULE ORAL at 08:01

## 2018-06-06 RX ADMIN — SENNOSIDES AND DOCUSATE SODIUM 1 TABLET: 8.6; 5 TABLET ORAL at 19:31

## 2018-06-06 NOTE — PROVIDER NOTIFICATION
Notified Dr. Mcfadden that patient's BP is elevated this evening >150/100.  MD entering new med order and would like to be notified again if BP >160/100.  Will continue to monitor and notify MD as needed.

## 2018-06-06 NOTE — PLAN OF CARE
Problem: Patient Care Overview  Goal: Plan of Care/Patient Progress Review  Outcome: Improving  Vs wnl independent w/ cares  FF at U/1 . On pathway  Continue to monitor.

## 2018-06-06 NOTE — PLAN OF CARE
Problem: Patient Care Overview  Goal: Plan of Care/Patient Progress Review  Outcome: Improving  Pain is under control with tylenol, ibuprofen, and dilaudid PO.  Pt has been up and walked in the elias way. Pt denied feeling headache, nausea, blurry vision, and/or epigastric pain. Bonding well with baby.  Pt is comfortable with baby cares.  Continues to monitor Pt.

## 2018-06-06 NOTE — PLAN OF CARE
Problem: Patient Care Overview  Goal: Plan of Care/Patient Progress Review  Outcome: Improving  Patient stable and up ad presley. SL in left arm. Pain well controlled, requesting medications prn. On pathway, continue to monitor.

## 2018-06-06 NOTE — PROGRESS NOTES
BPs elevated today, will start Procardia 30mg XL daily. Follow BPs today, consider labs if BPs severe range.     Lenka Mcfadden MD

## 2018-06-06 NOTE — PROGRESS NOTES
"OB Postpartum Note    S:  Patient without complaints. Nausea controlled, tolerating regular diet. Ambulating, voiding without difficulty. Passing flatus. Minimal lochia. Bottlefeeding. Pain controlled, but she felt bad with oxycodone--would like alternative.     O:  Vitals were reviewed  Blood pressure 126/90, pulse 72, temperature 98.4  F (36.9  C), temperature source Oral, resp. rate 16, height 1.537 m (5' 0.5\"), weight 66.2 kg (146 lb), SpO2 99 %, unknown if currently breastfeeding.          Gen - NAD, resting        CV - RRR        Abdomen - Fundus firm, below umbilicus, nontender        Incision - C/D/I         Extremities - No calf tenderness, no unilateral edema    Hemoglobin   Date Value Ref Range Status   06/05/2018 9.7 (L) 11.7 - 15.7 g/dL Final   ]  ABO   Date Value Ref Range Status   06/04/2018 O  Final   06/04/2018 O  Final     RH(D)   Date Value Ref Range Status   06/04/2018 Pos  Final   06/04/2018 Pos  Final     No components found for: RUBELLA    A:   Postoperative Day #2 , s/p primary CS for mild pre-eclampsia, IUGR and NRFHT- doing well    P:  1)  Routine care--BPs stable, no si/sx worsening pre-eclampsia.         2)  PO Dilaudid ordered--will try prior to discharge tomorrow.        Lenka Mcfadden MD                      "

## 2018-06-07 LAB
ALT SERPL W P-5'-P-CCNC: 41 U/L (ref 0–50)
AST SERPL W P-5'-P-CCNC: 56 U/L (ref 0–45)
CREAT SERPL-MCNC: 0.69 MG/DL (ref 0.52–1.04)
ERYTHROCYTE [DISTWIDTH] IN BLOOD BY AUTOMATED COUNT: 12.8 % (ref 10–15)
GFR SERPL CREATININE-BSD FRML MDRD: >90 ML/MIN/1.7M2
HCT VFR BLD AUTO: 29.1 % (ref 35–47)
HGB BLD-MCNC: 9.5 G/DL (ref 11.7–15.7)
MCH RBC QN AUTO: 31.4 PG (ref 26.5–33)
MCHC RBC AUTO-ENTMCNC: 32.6 G/DL (ref 31.5–36.5)
MCV RBC AUTO: 96 FL (ref 78–100)
PLATELET # BLD AUTO: 252 10E9/L (ref 150–450)
RBC # BLD AUTO: 3.03 10E12/L (ref 3.8–5.2)
WBC # BLD AUTO: 11.3 10E9/L (ref 4–11)

## 2018-06-07 PROCEDURE — 25000132 ZZH RX MED GY IP 250 OP 250 PS 637: Performed by: OBSTETRICS & GYNECOLOGY

## 2018-06-07 PROCEDURE — 36415 COLL VENOUS BLD VENIPUNCTURE: CPT | Performed by: PHYSICIAN ASSISTANT

## 2018-06-07 PROCEDURE — 84460 ALANINE AMINO (ALT) (SGPT): CPT | Performed by: OBSTETRICS & GYNECOLOGY

## 2018-06-07 PROCEDURE — 84450 TRANSFERASE (AST) (SGOT): CPT | Performed by: PHYSICIAN ASSISTANT

## 2018-06-07 PROCEDURE — 12000037 ZZH R&B POSTPARTUM INTERMEDIATE

## 2018-06-07 PROCEDURE — 84460 ALANINE AMINO (ALT) (SGPT): CPT | Performed by: PHYSICIAN ASSISTANT

## 2018-06-07 PROCEDURE — 82565 ASSAY OF CREATININE: CPT | Performed by: PHYSICIAN ASSISTANT

## 2018-06-07 PROCEDURE — 25000128 H RX IP 250 OP 636: Performed by: OBSTETRICS & GYNECOLOGY

## 2018-06-07 PROCEDURE — 25000132 ZZH RX MED GY IP 250 OP 250 PS 637: Performed by: PHYSICIAN ASSISTANT

## 2018-06-07 PROCEDURE — 85027 COMPLETE CBC AUTOMATED: CPT | Performed by: PHYSICIAN ASSISTANT

## 2018-06-07 RX ORDER — MAGNESIUM SULFATE IN WATER 40 MG/ML
2 INJECTION, SOLUTION INTRAVENOUS CONTINUOUS
Status: DISCONTINUED | OUTPATIENT
Start: 2018-06-07 | End: 2018-06-08

## 2018-06-07 RX ORDER — CALCIUM GLUCONATE 94 MG/ML
1 INJECTION, SOLUTION INTRAVENOUS
Status: DISCONTINUED | OUTPATIENT
Start: 2018-06-07 | End: 2018-06-08

## 2018-06-07 RX ORDER — LORAZEPAM 2 MG/ML
2 INJECTION INTRAMUSCULAR
Status: DISCONTINUED | OUTPATIENT
Start: 2018-06-07 | End: 2018-06-08

## 2018-06-07 RX ORDER — MAGNESIUM SULFATE HEPTAHYDRATE 40 MG/ML
4 INJECTION, SOLUTION INTRAVENOUS
Status: DISCONTINUED | OUTPATIENT
Start: 2018-06-07 | End: 2018-06-08

## 2018-06-07 RX ORDER — NIFEDIPINE 10 MG/1
10 CAPSULE ORAL
Status: DISCONTINUED | OUTPATIENT
Start: 2018-06-07 | End: 2018-06-08

## 2018-06-07 RX ORDER — LABETALOL 100 MG/1
200 TABLET, FILM COATED ORAL EVERY 12 HOURS SCHEDULED
Status: DISCONTINUED | OUTPATIENT
Start: 2018-06-07 | End: 2018-06-09 | Stop reason: HOSPADM

## 2018-06-07 RX ORDER — MAGNESIUM SULFATE HEPTAHYDRATE 40 MG/ML
2 INJECTION, SOLUTION INTRAVENOUS
Status: DISCONTINUED | OUTPATIENT
Start: 2018-06-07 | End: 2018-06-08

## 2018-06-07 RX ORDER — SODIUM CHLORIDE, SODIUM LACTATE, POTASSIUM CHLORIDE, CALCIUM CHLORIDE 600; 310; 30; 20 MG/100ML; MG/100ML; MG/100ML; MG/100ML
10-125 INJECTION, SOLUTION INTRAVENOUS CONTINUOUS
Status: DISCONTINUED | OUTPATIENT
Start: 2018-06-07 | End: 2018-06-08

## 2018-06-07 RX ORDER — MAGNESIUM SULFATE HEPTAHYDRATE 40 MG/ML
4 INJECTION, SOLUTION INTRAVENOUS ONCE
Status: COMPLETED | OUTPATIENT
Start: 2018-06-07 | End: 2018-06-07

## 2018-06-07 RX ORDER — MAGNESIUM SULFATE HEPTAHYDRATE 500 MG/ML
4 INJECTION, SOLUTION INTRAMUSCULAR; INTRAVENOUS
Status: DISCONTINUED | OUTPATIENT
Start: 2018-06-07 | End: 2018-06-08

## 2018-06-07 RX ADMIN — MAGNESIUM SULFATE IN WATER 2 G/HR: 40 INJECTION, SOLUTION INTRAVENOUS at 21:01

## 2018-06-07 RX ADMIN — ACETAMINOPHEN 975 MG: 325 TABLET, FILM COATED ORAL at 11:22

## 2018-06-07 RX ADMIN — IBUPROFEN 800 MG: 400 TABLET ORAL at 01:45

## 2018-06-07 RX ADMIN — PRENATAL VIT W/ FE FUMARATE-FA TAB 27-0.8 MG 1 TABLET: 27-0.8 TAB at 08:40

## 2018-06-07 RX ADMIN — ACETAMINOPHEN 975 MG: 325 TABLET, FILM COATED ORAL at 03:37

## 2018-06-07 RX ADMIN — SODIUM CHLORIDE, POTASSIUM CHLORIDE, SODIUM LACTATE AND CALCIUM CHLORIDE 75 ML/HR: 600; 310; 30; 20 INJECTION, SOLUTION INTRAVENOUS at 22:38

## 2018-06-07 RX ADMIN — HYDROMORPHONE HYDROCHLORIDE 2 MG: 2 TABLET ORAL at 22:37

## 2018-06-07 RX ADMIN — NIFEDIPINE 30 MG: 30 TABLET, FILM COATED, EXTENDED RELEASE ORAL at 08:40

## 2018-06-07 RX ADMIN — ACETAMINOPHEN 650 MG: 325 TABLET, FILM COATED ORAL at 18:44

## 2018-06-07 RX ADMIN — HYDROMORPHONE HYDROCHLORIDE 2 MG: 2 TABLET ORAL at 04:53

## 2018-06-07 RX ADMIN — ACETAMINOPHEN 650 MG: 325 TABLET, FILM COATED ORAL at 22:37

## 2018-06-07 RX ADMIN — LABETALOL HYDROCHLORIDE 200 MG: 100 TABLET, FILM COATED ORAL at 20:57

## 2018-06-07 RX ADMIN — FLUOXETINE 10 MG: 10 CAPSULE ORAL at 08:40

## 2018-06-07 RX ADMIN — IBUPROFEN 800 MG: 400 TABLET ORAL at 08:40

## 2018-06-07 RX ADMIN — SODIUM CHLORIDE, POTASSIUM CHLORIDE, SODIUM LACTATE AND CALCIUM CHLORIDE 75 ML/HR: 600; 310; 30; 20 INJECTION, SOLUTION INTRAVENOUS at 11:24

## 2018-06-07 RX ADMIN — LABETALOL HYDROCHLORIDE 200 MG: 100 TABLET, FILM COATED ORAL at 09:13

## 2018-06-07 RX ADMIN — MAGNESIUM SULFATE IN WATER 4 G: 40 INJECTION, SOLUTION INTRAVENOUS at 11:38

## 2018-06-07 RX ADMIN — MAGNESIUM SULFATE IN WATER 2 G/HR: 40 INJECTION, SOLUTION INTRAVENOUS at 12:11

## 2018-06-07 RX ADMIN — IBUPROFEN 800 MG: 400 TABLET ORAL at 21:01

## 2018-06-07 RX ADMIN — IBUPROFEN 800 MG: 400 TABLET ORAL at 15:06

## 2018-06-07 NOTE — PLAN OF CARE
Problem: Patient Care Overview  Goal: Plan of Care/Patient Progress Review  Outcome: No Change  Pain controlled with tylenol,motrin and occasional dilaudid  Passing flatus incision clear Tegaderm on    Afebrile BP running  130/83,122/90   +3 edema both legs  Headache better now with  lying flat and sleeping denies heartburn  blurred vision ,epigastric pain up ambulating voiding ok  Bottle feeding baby encouraged to call with  Needs continue to monitor and notify MD as needed

## 2018-06-07 NOTE — PLAN OF CARE
Problem: Patient Care Overview  Goal: Plan of Care/Patient Progress Review  Outcome: Improving  Pt was started on Mg+ today along with labetolol 200mg for elevated BP's, headache and epigastric pain.  Since starting these meds, her headache has gotten better and she no longer has epigastric pain.  Pt is up with SBA due to dizziness when first ambulating, so encouraged to call for assist to get to the bathroom.  Pt enc to call with any needs, questions or concerns.

## 2018-06-07 NOTE — PROVIDER NOTIFICATION
Notified Dr Mcfadden with patient vitals updates and lab results no new orders will continue to monitor and notify MD as needed.

## 2018-06-07 NOTE — PROGRESS NOTES
"Postpartum Progress Note:    S: Pt is comfotable. Mild HA and RUQ pain.     O:  /84  Pulse 98  Temp 97.9  F (36.6  C) (Oral)  Resp 16  Ht 1.537 m (5' 0.5\")  Wt 63 kg (138 lb 14.2 oz)  SpO2 97%  Breastfeeding? Unknown  BMI 26.68 kg/m2  Abdomen:  Firm. Incision is intact without signs of infection.     A/P: POD#3 now with postpartum pre-e.   1. Continue magnesium sulfate and HTN med  - If stable tomorrow AM consider D/C magnesium sulfate.   - Continue on Labetalol and d/c home with f/u in clinic.     Katya Moreno    "

## 2018-06-07 NOTE — PLAN OF CARE
Magnesium sulfate loading dose administered over 30 min.  BP 120s-80-90s, HR 80s, RR 16, O2 sats 95-99% during loading dose. Pt experienced flushing, heavy feeling on chest. No other side effects noted. Pt tolerated well;  at bedside; supportive.

## 2018-06-07 NOTE — PROVIDER NOTIFICATION
Pt  c/o headache , heartburn /105 HR 89 and  +3 edema both legs  MD notified ordered ALT,AST.CBC with platelet ,Nefedipin Short Acting  20 mg PO one time dose given  and BP every 4 hrs done   Will continue to monitor and notify MD as needed

## 2018-06-07 NOTE — PLAN OF CARE
Problem: Patient Care Overview  Goal: Plan of Care/Patient Progress Review  Outcome: Improving  VSS- patient was having high BPs this evening- MD notified and patient started on Procardia, BP has since decreased below parameters- see MD notification note.  Mild discomfort controlled with tylenol/ibuprofen and oral dilaudid as needed.  Incision c,d,i with tegaderm, FF U/1, scant flow.  Bottling baby formula.  Mother very independent with cares and bonding well with infant.  Will continue to monitor and update MD as needed.

## 2018-06-07 NOTE — PROGRESS NOTES
#3 Postop  IUGR, Mild preeclampsia 37 weeks.    Pt states doing well. Mild dull headache.  Pain well controlled.  Nursing.    Afebrile. BP elevated . HGB 9.0  Fundus firm. Light flow.   Incision dry and intact.  Passing flatus. Voiding w/o problems.  Ext: 2+ edema to knees, no pain.    Postpartum Eclampsia.  Blood loss anemia.    Reviewed Postpartum eclampsia.  Start Labetolol. Await labs.  Continue to monitor BP and labs.

## 2018-06-08 LAB
ALT SERPL W P-5'-P-CCNC: 106 U/L (ref 0–50)
ALT SERPL W P-5'-P-CCNC: 122 U/L (ref 0–50)
ALT SERPL W P-5'-P-CCNC: 85 U/L (ref 0–50)
AST SERPL W P-5'-P-CCNC: 107 U/L (ref 0–45)
AST SERPL W P-5'-P-CCNC: 122 U/L (ref 0–45)
AST SERPL W P-5'-P-CCNC: 122 U/L (ref 0–45)
CREAT SERPL-MCNC: 0.68 MG/DL (ref 0.52–1.04)
ERYTHROCYTE [DISTWIDTH] IN BLOOD BY AUTOMATED COUNT: 13 % (ref 10–15)
ERYTHROCYTE [DISTWIDTH] IN BLOOD BY AUTOMATED COUNT: 13.1 % (ref 10–15)
GFR SERPL CREATININE-BSD FRML MDRD: >90 ML/MIN/1.7M2
HCT VFR BLD AUTO: 26.9 % (ref 35–47)
HCT VFR BLD AUTO: 27.3 % (ref 35–47)
HGB BLD-MCNC: 9 G/DL (ref 11.7–15.7)
HGB BLD-MCNC: 9.2 G/DL (ref 11.7–15.7)
MAGNESIUM SERPL-MCNC: 5.8 MG/DL (ref 1.6–2.3)
MCH RBC QN AUTO: 31.8 PG (ref 26.5–33)
MCH RBC QN AUTO: 32.6 PG (ref 26.5–33)
MCHC RBC AUTO-ENTMCNC: 33 G/DL (ref 31.5–36.5)
MCHC RBC AUTO-ENTMCNC: 34.2 G/DL (ref 31.5–36.5)
MCV RBC AUTO: 95 FL (ref 78–100)
MCV RBC AUTO: 97 FL (ref 78–100)
PLATELET # BLD AUTO: 241 10E9/L (ref 150–450)
PLATELET # BLD AUTO: 266 10E9/L (ref 150–450)
RBC # BLD AUTO: 2.82 10E12/L (ref 3.8–5.2)
RBC # BLD AUTO: 2.83 10E12/L (ref 3.8–5.2)
WBC # BLD AUTO: 8.6 10E9/L (ref 4–11)
WBC # BLD AUTO: 9.5 10E9/L (ref 4–11)

## 2018-06-08 PROCEDURE — 86850 RBC ANTIBODY SCREEN: CPT | Performed by: OBSTETRICS & GYNECOLOGY

## 2018-06-08 PROCEDURE — 36415 COLL VENOUS BLD VENIPUNCTURE: CPT | Performed by: OBSTETRICS & GYNECOLOGY

## 2018-06-08 PROCEDURE — 85027 COMPLETE CBC AUTOMATED: CPT | Performed by: OBSTETRICS & GYNECOLOGY

## 2018-06-08 PROCEDURE — 84450 TRANSFERASE (AST) (SGOT): CPT | Performed by: OBSTETRICS & GYNECOLOGY

## 2018-06-08 PROCEDURE — 86901 BLOOD TYPING SEROLOGIC RH(D): CPT | Performed by: OBSTETRICS & GYNECOLOGY

## 2018-06-08 PROCEDURE — 86900 BLOOD TYPING SEROLOGIC ABO: CPT | Performed by: OBSTETRICS & GYNECOLOGY

## 2018-06-08 PROCEDURE — 25000132 ZZH RX MED GY IP 250 OP 250 PS 637: Performed by: PHYSICIAN ASSISTANT

## 2018-06-08 PROCEDURE — 84460 ALANINE AMINO (ALT) (SGPT): CPT | Performed by: OBSTETRICS & GYNECOLOGY

## 2018-06-08 PROCEDURE — 82565 ASSAY OF CREATININE: CPT | Performed by: OBSTETRICS & GYNECOLOGY

## 2018-06-08 PROCEDURE — 83735 ASSAY OF MAGNESIUM: CPT | Performed by: OBSTETRICS & GYNECOLOGY

## 2018-06-08 PROCEDURE — 25000128 H RX IP 250 OP 636: Performed by: OBSTETRICS & GYNECOLOGY

## 2018-06-08 PROCEDURE — 12000037 ZZH R&B POSTPARTUM INTERMEDIATE

## 2018-06-08 PROCEDURE — 25000132 ZZH RX MED GY IP 250 OP 250 PS 637: Performed by: OBSTETRICS & GYNECOLOGY

## 2018-06-08 RX ORDER — NIFEDIPINE 10 MG/1
10 CAPSULE ORAL
Status: DISCONTINUED | OUTPATIENT
Start: 2018-06-08 | End: 2018-06-09 | Stop reason: HOSPADM

## 2018-06-08 RX ORDER — MAGNESIUM SULFATE HEPTAHYDRATE 500 MG/ML
4 INJECTION, SOLUTION INTRAMUSCULAR; INTRAVENOUS
Status: DISCONTINUED | OUTPATIENT
Start: 2018-06-08 | End: 2018-06-09 | Stop reason: HOSPADM

## 2018-06-08 RX ORDER — MAGNESIUM SULFATE HEPTAHYDRATE 40 MG/ML
4 INJECTION, SOLUTION INTRAVENOUS ONCE
Status: COMPLETED | OUTPATIENT
Start: 2018-06-08 | End: 2018-06-08

## 2018-06-08 RX ORDER — MAGNESIUM SULFATE HEPTAHYDRATE 40 MG/ML
4 INJECTION, SOLUTION INTRAVENOUS
Status: DISCONTINUED | OUTPATIENT
Start: 2018-06-08 | End: 2018-06-09 | Stop reason: HOSPADM

## 2018-06-08 RX ORDER — MAGNESIUM SULFATE IN WATER 40 MG/ML
1.5 INJECTION, SOLUTION INTRAVENOUS CONTINUOUS
Status: DISCONTINUED | OUTPATIENT
Start: 2018-06-08 | End: 2018-06-09 | Stop reason: HOSPADM

## 2018-06-08 RX ORDER — LORAZEPAM 2 MG/ML
2 INJECTION INTRAMUSCULAR
Status: DISCONTINUED | OUTPATIENT
Start: 2018-06-08 | End: 2018-06-09 | Stop reason: HOSPADM

## 2018-06-08 RX ORDER — MAGNESIUM SULFATE HEPTAHYDRATE 40 MG/ML
2 INJECTION, SOLUTION INTRAVENOUS
Status: DISCONTINUED | OUTPATIENT
Start: 2018-06-08 | End: 2018-06-09 | Stop reason: HOSPADM

## 2018-06-08 RX ORDER — SODIUM CHLORIDE, SODIUM LACTATE, POTASSIUM CHLORIDE, CALCIUM CHLORIDE 600; 310; 30; 20 MG/100ML; MG/100ML; MG/100ML; MG/100ML
10-125 INJECTION, SOLUTION INTRAVENOUS CONTINUOUS
Status: DISCONTINUED | OUTPATIENT
Start: 2018-06-08 | End: 2018-06-09 | Stop reason: HOSPADM

## 2018-06-08 RX ORDER — CALCIUM GLUCONATE 94 MG/ML
1 INJECTION, SOLUTION INTRAVENOUS
Status: DISCONTINUED | OUTPATIENT
Start: 2018-06-08 | End: 2018-06-09 | Stop reason: HOSPADM

## 2018-06-08 RX ADMIN — MAGNESIUM SULFATE IN WATER 4 G: 40 INJECTION, SOLUTION INTRAVENOUS at 14:00

## 2018-06-08 RX ADMIN — HYDROMORPHONE HYDROCHLORIDE 2 MG: 2 TABLET ORAL at 02:15

## 2018-06-08 RX ADMIN — ACETAMINOPHEN 650 MG: 325 TABLET, FILM COATED ORAL at 11:48

## 2018-06-08 RX ADMIN — ACETAMINOPHEN 650 MG: 325 TABLET, FILM COATED ORAL at 23:08

## 2018-06-08 RX ADMIN — IBUPROFEN 800 MG: 400 TABLET ORAL at 02:15

## 2018-06-08 RX ADMIN — SODIUM CHLORIDE, POTASSIUM CHLORIDE, SODIUM LACTATE AND CALCIUM CHLORIDE 50 ML/HR: 600; 310; 30; 20 INJECTION, SOLUTION INTRAVENOUS at 14:01

## 2018-06-08 RX ADMIN — IBUPROFEN 800 MG: 400 TABLET ORAL at 15:10

## 2018-06-08 RX ADMIN — FLUOXETINE 10 MG: 10 CAPSULE ORAL at 08:48

## 2018-06-08 RX ADMIN — ACETAMINOPHEN 650 MG: 325 TABLET, FILM COATED ORAL at 17:42

## 2018-06-08 RX ADMIN — ACETAMINOPHEN 650 MG: 325 TABLET, FILM COATED ORAL at 05:42

## 2018-06-08 RX ADMIN — PRENATAL VIT W/ FE FUMARATE-FA TAB 27-0.8 MG 1 TABLET: 27-0.8 TAB at 08:48

## 2018-06-08 RX ADMIN — IBUPROFEN 800 MG: 400 TABLET ORAL at 08:48

## 2018-06-08 RX ADMIN — IBUPROFEN 800 MG: 400 TABLET ORAL at 20:54

## 2018-06-08 RX ADMIN — MAGNESIUM SULFATE IN WATER 1.5 G/HR: 40 INJECTION, SOLUTION INTRAVENOUS at 15:05

## 2018-06-08 RX ADMIN — NIFEDIPINE 30 MG: 30 TABLET, FILM COATED, EXTENDED RELEASE ORAL at 08:48

## 2018-06-08 RX ADMIN — LABETALOL HYDROCHLORIDE 200 MG: 100 TABLET, FILM COATED ORAL at 20:53

## 2018-06-08 RX ADMIN — LABETALOL HYDROCHLORIDE 200 MG: 100 TABLET, FILM COATED ORAL at 08:48

## 2018-06-08 NOTE — PLAN OF CARE
Problem: Patient Care Overview  Goal: Plan of Care/Patient Progress Review  Outcome: Improving  Patient's BP improved overnight. 115/80 and 135/90. Patient denies visual changes or epigastric pain. She states her HA, which was mild earlier in the night is resolved. She has some dizziness with ambulation. She remains on falls precautions. Bilateral feet/ankles with +3 edema. Reflexes +2-+3 with no clonus present. She is taking tylenol, ibuprofen and PO dilaudid for discomfort. She is voiding good amounts of urine. She is applying ice to her breasts and incision for comfort. Her  is present and supportive. Will continue plan of care.

## 2018-06-08 NOTE — PLAN OF CARE
Problem: Patient Care Overview  Goal: Plan of Care/Patient Progress Review  Outcome: Improving  Data: Vital signs BP continues to be elevated. Postpartum checks within normal limits - see flow record. Patient eating and drinking normally. Patient able to empty bladder independently and is up ambulating. No apparent signs of infection. Incision healing well. Patient performing self cares and is able to care for infant.  Action: Patient medicated during the shift for pain. See MAR. Patient reassessed within 1 hour after each medication and pain was improved - patient stated she was comfortable. Patient education done. See flow record.  Response: Positive attachment behaviors observed with infant. Support persons is not present.   Plan: Anticipate discharge on 6/8.

## 2018-06-08 NOTE — PROGRESS NOTES
POD4  Patient feels well  AFVSS-BPs 120-130/80  Fundus firm  Incision CDI  3+ edema    AST 85      A/P Pre-eclampsia appeared to improve after 24 hours of Mg with stabilization of BPs. Mg was discontinued this morning. However, LFTs have increased. Will recheck trend at 1200

## 2018-06-08 NOTE — PROGRESS NOTES
/.     Plan to re-start magnesium due to worsening LFTs though BPs are stable. RN reports patient is starting to diurese more. Because she felt so weak on magnesium this morning, will check a magnesium level this afternoon to monitor for toxicity.

## 2018-06-09 VITALS
DIASTOLIC BLOOD PRESSURE: 82 MMHG | RESPIRATION RATE: 16 BRPM | HEIGHT: 61 IN | HEART RATE: 72 BPM | BODY MASS INDEX: 25.39 KG/M2 | OXYGEN SATURATION: 97 % | TEMPERATURE: 98 F | WEIGHT: 134.48 LBS | SYSTOLIC BLOOD PRESSURE: 118 MMHG

## 2018-06-09 LAB
ALT SERPL W P-5'-P-CCNC: 127 U/L (ref 0–50)
ALT SERPL W P-5'-P-CCNC: 128 U/L (ref 0–50)
AST SERPL W P-5'-P-CCNC: 100 U/L (ref 0–45)
AST SERPL W P-5'-P-CCNC: 108 U/L (ref 0–45)

## 2018-06-09 PROCEDURE — 25000132 ZZH RX MED GY IP 250 OP 250 PS 637: Performed by: PHYSICIAN ASSISTANT

## 2018-06-09 PROCEDURE — 36415 COLL VENOUS BLD VENIPUNCTURE: CPT | Performed by: OBSTETRICS & GYNECOLOGY

## 2018-06-09 PROCEDURE — 84460 ALANINE AMINO (ALT) (SGPT): CPT | Performed by: OBSTETRICS & GYNECOLOGY

## 2018-06-09 PROCEDURE — 84450 TRANSFERASE (AST) (SGOT): CPT | Performed by: OBSTETRICS & GYNECOLOGY

## 2018-06-09 PROCEDURE — 25000132 ZZH RX MED GY IP 250 OP 250 PS 637: Performed by: OBSTETRICS & GYNECOLOGY

## 2018-06-09 PROCEDURE — 25000128 H RX IP 250 OP 636: Performed by: OBSTETRICS & GYNECOLOGY

## 2018-06-09 RX ORDER — NIFEDIPINE 30 MG
30 TABLET, EXTENDED RELEASE ORAL DAILY
Qty: 30 TABLET | Refills: 0 | Status: SHIPPED | OUTPATIENT
Start: 2018-06-10

## 2018-06-09 RX ORDER — LABETALOL 200 MG/1
200 TABLET, FILM COATED ORAL EVERY 12 HOURS
Qty: 60 TABLET | Refills: 0 | Status: SHIPPED | OUTPATIENT
Start: 2018-06-09

## 2018-06-09 RX ORDER — HYDROMORPHONE HYDROCHLORIDE 2 MG/1
2 TABLET ORAL
Qty: 20 TABLET | Refills: 0 | Status: SHIPPED | OUTPATIENT
Start: 2018-06-09

## 2018-06-09 RX ADMIN — NIFEDIPINE 30 MG: 30 TABLET, FILM COATED, EXTENDED RELEASE ORAL at 08:38

## 2018-06-09 RX ADMIN — ACETAMINOPHEN 650 MG: 325 TABLET, FILM COATED ORAL at 09:43

## 2018-06-09 RX ADMIN — SENNOSIDES AND DOCUSATE SODIUM 1 TABLET: 8.6; 5 TABLET ORAL at 05:09

## 2018-06-09 RX ADMIN — FLUOXETINE 10 MG: 10 CAPSULE ORAL at 08:39

## 2018-06-09 RX ADMIN — SODIUM CHLORIDE, POTASSIUM CHLORIDE, SODIUM LACTATE AND CALCIUM CHLORIDE 50 ML/HR: 600; 310; 30; 20 INJECTION, SOLUTION INTRAVENOUS at 08:25

## 2018-06-09 RX ADMIN — PRENATAL VIT W/ FE FUMARATE-FA TAB 27-0.8 MG 1 TABLET: 27-0.8 TAB at 08:38

## 2018-06-09 RX ADMIN — LABETALOL HYDROCHLORIDE 200 MG: 100 TABLET, FILM COATED ORAL at 08:39

## 2018-06-09 RX ADMIN — HYDROMORPHONE HYDROCHLORIDE 2 MG: 2 TABLET ORAL at 00:27

## 2018-06-09 RX ADMIN — MAGNESIUM SULFATE IN WATER 1.5 G/HR: 40 INJECTION, SOLUTION INTRAVENOUS at 04:19

## 2018-06-09 RX ADMIN — IBUPROFEN 800 MG: 400 TABLET ORAL at 09:43

## 2018-06-09 RX ADMIN — ACETAMINOPHEN 650 MG: 325 TABLET, FILM COATED ORAL at 03:25

## 2018-06-09 RX ADMIN — IBUPROFEN 800 MG: 400 TABLET ORAL at 03:25

## 2018-06-09 NOTE — PROGRESS NOTES
"Feels well. Denies HA, visual change.  BP (!) 137/98  Pulse 81  Temp 98.3  F (36.8  C) (Oral)  Resp 16  Ht 1.537 m (5' 0.5\")  Wt 61 kg (134 lb 7.7 oz)  SpO2 97%  Breastfeeding? Unknown  BMI 25.83 kg/m2   NAD  Abd: Soft, ND  Inc: CDI  Ext: 1+ edema     (122)   (122)    A/P: 33 year old  POD 5 s/p LTCS c/b pre-eclampsia.  ALT is slightly higher but patient is clinically well and diuresing  Repeat LFTs at 1200, likely home this afternoon.  F/u 2 days in office.  Tory Rivera   "

## 2018-06-09 NOTE — PLAN OF CARE
Problem: Patient Care Overview  Goal: Plan of Care/Patient Progress Review  Outcome: Declining  B/P's running 120'/s 80's, incision intact. Pain managed with tylenol and ibuprofen. Pt has declined PO dilaudid this shift. Pt was started back on Magnesium due to increasing AST and ALT levels. A loading dose of 4 gm Magnesium was started at 1400 and was infused over an hour per the orders of Dr. VIPUL Rivera. Pt tolerated this well. VS checked per protocol. Continuous infusing of Magnesium at 1.5 gm and hour was then started and continues to infuse along with LR infusing at 50 cc's/hr. Pt denies headache, epigastric pain or visual disturbance. Pt does have +3 pedal edema and at 1900 check did have 1 beat of clonus bilaterally, Dr. STU Lawson updated on this as well as 1800 labs. No new orders received but we should update Dr. Lawson overnight with any new changes. Pt is diuresing well and is tolerating good po intake. Pt is bottle feeding  and is using ice packs to help with the discomfort of her milk coming in.  was discharged today and pt. spouse took  home so that pt could rest this evening. Pt's mother plans to stay with pt overnight.

## 2018-06-09 NOTE — PLAN OF CARE
"Problem: Patient Care Overview  Goal: Plan of Care/Patient Progress Review  Outcome: Improving  BP elevated throughout the night (see flowsheet). No clonus, declined s/sx preeclampsia.  Has 2+ bilateral lower extremitie edema.  Patient stated she is tolerating the magnesium infusion \"way better\" than previous infusion. Tagaderm dressing over incision has a scant amount of serosanguinous drianage.  Up with stand by assist , tolerated well overnight.  Voiding adequately.  Weight down 4lbs from previous night.  ALT & AST lab scheduled for AM.  Pain well controlled, requesting prn pain meds as needed.  Patients mother present throughout the night for support. [place] home with dad & siblings. Will continue to monitor and notify MD as needed.      "

## 2018-06-09 NOTE — PROVIDER NOTIFICATION
Dr. JAN Rivera paged for update on lab results.   Orders received from Dr. JAN Rivera to discharge patient.

## 2018-06-09 NOTE — PLAN OF CARE
Problem: Patient Care Overview  Goal: Plan of Care/Patient Progress Review  Outcome: Adequate for Discharge Date Met: 06/09/18  B/P improving, fundus firm with scant flow. Incision intact with barrier film. Pain managed with tylenol and ibuprofen this shift, pt has declined the need for dilaudid. Magnesium 1.5 g/hr and LR @ 50 cc's.hr  infusing until 1300 when Dr. Rivera placed order for discharge. Pt diuresing and tolerating good PO intake. Dr. JAN Rivera updated about pt AST being 127 ( down from 128) and  ( down from 108). Pt denies any headache, visual disturbance or epigastric pain. Pt does have +2 bilateral pedal edema. Normal reflexes and no clonus. Pt using ice packs to chest due to milk coming in and pt is not breast feeding.  Discharge instructions provided to patient to include instructions about preeclampsia. Prescriptions for labetalol, nifedipine and dilaudid given to patient for home use. All questions answered at that time. Patient discharged via .

## 2018-06-09 NOTE — DISCHARGE INSTRUCTIONS
Postop  Birth Instructions    Activity       Do not lift more than 10 pounds for 6 weeks after surgery.  Ask family and friends for help when you need it.    No driving until you have stopped taking your pain medications (usually two weeks after surgery).    No heavy exercise or activity for 6 weeks.  Don't do anything that will put a strain on your surgery site.    Don't strain when using the toilet.  Your care team may prescribe a stool softener if you have problems with your bowel movements.     To care for your incision:       Keep the incision clean and dry.    Do not soak your incision in water. No swimming or hot tubs until it has fully healed. You may soak in the bathtub if the water level is below your incision.    Do not use peroxide, gel, cream, lotion, or ointment on your incision.    Adjust your clothes to avoid pressure on your surgery site (check the elastic in your underwear for example).     You may see a small amount of clear or pink drainage and this is normal.  Check with your health care provider:       If the drainage increases or has an odor.    If the incision reddens, you have swelling, or develop a rash.    If you have increased pain and the medicine we prescribed doesn't help.    If you have a fever above 100.4 F (38 C) with or without chills when placing thermometer under your tongue.   The area around your incision (surgery wound), will feel numb.  This is normal. The numbness should go away in less than a year.     Keep your hands clean:  Always wash your hands before touching your incision (surgery wound). This helps reduce your risk of infection. If your hands aren't dirty, you may use an alcohol hand-rub to clean your hands. Keep your nails clean and short.    Call your healthcare provider if you have any of these symptoms:       You soak a sanitary pad with blood within 1 hour, or you see blood clots larger than a golf ball.    Bleeding that lasts more than 6  weeks.    Vaginal discharge that smells bad.    Severe pain, cramping or tenderness in your lower belly area.    A need to urinate more frequently (use the toilet more often), more urgently (use the toilet very quickly), or it burns when you urinate.    Nausea and vomiting.    Redness, swelling or pain around a vein in your leg.    Problems breastfeeding or a red or painful area on your breast.    Chest pain and cough or are gasping for air.    Problems with coping with sadness, anxiety or depression. If you have concerns about hurting yourself or the baby, call your provider immediately.      You have questions or concerns after you return home.     Call your provider if you notice:  Swelling in your face or increased swelling in your hands or legs.  Headaches that are not relieved by Tylenol (acetaminophen).  Changes in your vision (blurring: seeing spots or stars.)  Nausea (sick to your stomach) and vomiting (throwing up).   Weight gain of 5 pounds or more per week.  Heartburn that doesn't go away.  Signs of bladder infection: pain when you urinate (use the toilet), need to go more often and more urgently.    Follow-up:  Monday

## 2018-06-12 LAB — INTERPRETATION ECG - MUSE: NORMAL

## 2018-06-12 NOTE — DISCHARGE SUMMARY
Admit Date:     2018   Discharge Date:     2018      DATE OF ADMISSION:  2018.      DATE OF DISCHARGE:  2018.      ADMISSION DIAGNOSES:  37-3/7 weeks' gestation, hypertension, intrauterine growth restriction.      DISCHARGE DIAGNOSES:  37-3/7 weeks' gestation, hypertension, intrauterine growth restriction, preeclampsia.      PROCEDURE:  Primary low transverse  section.      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  She also developed intrauterine growth restriction at 36 weeks.  She presents at 37 weeks with elevated blood pressures in the 150s over 90s range.  At this time, her preeclampsia labs were normal.        HOSPITAL COURSE:  She was admitted to Labor and Delivery.  M consult was obtained, and due to the elevated blood pressures and an elevated protein creatinine ratio, a diagnosis of preeclampsia without severe features was made.  Given the presence of intrauterine growth restriction, delivery was advised.  Her cervix was unfavorable due to a high station.  She was started on Pitocin.  She received an epidural for pain control.  Fetal heart tones demonstrated repetitive decelerations in early labor, and at this time, the patient was advised to undergo  section.  For details of her surgery, please see her operative note dated .  Postoperatively, the patient remained afebrile.  Her postoperative hemoglobin was 9.7, which was felt to be appropriate.  She was able to void freely on postoperative day 1, and her pain was adequately controlled.  Her blood pressures remained elevated in the 130s over 90s on the first day.  However, on the second day, they became further elevated to the 160s over 100s.  She was started on Procardia-XL 30 mg daily.  At this time, her preeclampsia labs were  ordered and were normal.  On postoperative day #3, her blood pressures remained further elevated, and she was started on labetalol.  Repeat preeclampsia labs were ordered at this time and now noted to have elevated liver enzymes with an AST of 56.  She was started on magnesium sulfate.  Serial labs were performed, and her liver function enzymes continued to rise, reaching a max of an ALT of 128 and an AST of 122.  After 24 hours of magnesium, her blood pressures were well controlled on labetalol and nifedipine, and her LFTs showed a general trend downwards.  At this time, she was deemed stable for discharge.  This was postoperative day #5.  She was given instructions to follow up in 2 days' time.  Her incision remained clean, dry and intact throughout the hospitalization.         ROSALVA ZARATE MD             D: 2018   T: 2018   MT: TIFF      Name:     VICTOR MANUEL HERRERA   MRN:      [id number]        Account:        [id number]   :      1985           Admit Date:     2018                                  Discharge Date: 2018      Document: [id number]       cc: Angel BAILON

## 2018-07-03 ENCOUNTER — HEALTH MAINTENANCE LETTER (OUTPATIENT)
Age: 33
End: 2018-07-03

## 2018-08-20 ENCOUNTER — THERAPY VISIT (OUTPATIENT)
Dept: PHYSICAL THERAPY | Facility: CLINIC | Age: 33
End: 2018-08-20
Payer: COMMERCIAL

## 2018-08-20 DIAGNOSIS — Z98.891 S/P CESAREAN SECTION: Primary | ICD-10-CM

## 2018-08-20 PROCEDURE — 97140 MANUAL THERAPY 1/> REGIONS: CPT | Mod: GP | Performed by: PHYSICAL THERAPIST

## 2018-08-20 PROCEDURE — 97161 PT EVAL LOW COMPLEX 20 MIN: CPT | Mod: GP | Performed by: PHYSICAL THERAPIST

## 2018-08-20 PROCEDURE — 97112 NEUROMUSCULAR REEDUCATION: CPT | Mod: GP | Performed by: PHYSICAL THERAPIST

## 2018-08-20 NOTE — PROGRESS NOTES
"  HPI  System  Physical Exam  General   ROS        Physical Therapy Initial Examination/Evaluation 2018   Imelda Zepeda is a 33 year old female self- referred to physical therapy for treatment of abdominal weakness and pain following  in 2018 with Precautions/Restrictions/MD instructions NA   Therapist Assessment:   Clinical Impression: Pt presents to [facility] with primary complaint of core weakness and incisional pain following  on 2018.    Per clinical examination, pt does demonstrate core and proximal weakness.  Decreased mobility noted along  scar.  Pt will benefit from skilled physical therapy for core stability program as well as manual techniques along scar with education for home program.   Subjective:  Patient had a vaginal birth with son 3 years ago. She had a daughter on 2018 via . Pt reports no incontinence but more difficulty holding urine for long periods of time. Pt is not currently nursing.  DOI/onset: 2018   Mechanism of injury: C section 2018   DOS 2018   Related PMH: IT issues on the L , R shoulder subluxation  Previous treatment: PT    Imaging: NA   Chief Complaint: Instability in core   Pain: \"discomfort\"  Described as: unstable Alleviated by: unknown Exacerbated by: lifting Progression of symptoms since initial onset: improving Time of day when pain is worse: none in particular   Sleeping: no issues    Social history: ; 3 year old son, 11 week old daughter   Occupation: Marketing Job duties: computer work, prolonged sitting   Current HEP/exercise regimen: worked out throughout pregnancy; has started running & light lifting weights    Patient's goals are Nervous that she is going to injure scar ; know what exercises she can perform on her own  Other pertinent PMH: Depression  General health as reported by patient: Excellent   Return to MD: prn      Lumbar Spine Evaluation  Static Posture  Foot: " Pes planus/cavus: WNL    Knee: Varus/Valgus: WNL     Hip: Adduction/IR: WNL    Dynamic Movement Screen  Single leg stance observations: Able to stand for 30s bilat but with increased sway  Double limb squat observations: WNL  Single limb squat observations: bilat hip IR    Trunk Range of Motion  Flexion: WNL  Extension: WNL  Side bending: WNL but some increased discomfort with L sidebending  Rotation: 75% ROM bilat with some pulling along scar  Hamstring: slight hypertonic bilat      Hip and Knee Strength   MMT Hip Abduction Hip Extension Hip Flexion    Left 4/5 4/5 4+/5   Right 4/5 4/5 4+/5     Palpation: Diastasis Recti: 1.5 finger-width above umbilicus, .5 finger-width below umbilicus    Assessment/Plan:    Patient is a 33 year old female with Abdominal complaints.    Patient has the following significant findings with corresponding treatment plan.                Diagnosis 1:  Abdominal weakness following    Pain -  manual therapy, self management, education and home program  Decreased ROM/flexibility - manual therapy, therapeutic exercise and home program  Decreased strength - therapeutic exercise, therapeutic activities and home program  Impaired muscle performance - neuro re-education and home program  Decreased function - therapeutic activities and home program    Therapy Evaluation Codes:   1) History comprised of:   Personal factors that impact the plan of care:      Past/current experiences and Time since onset of symptoms.    Comorbidity factors that impact the plan of care are:      Depression.     Medications impacting care: Anti-depressant.  2) Examination of Body Systems comprised of:   Body structures and functions that impact the plan of care:      Lumbar spine and Pelvis.   Activity limitations that impact the plan of care are:      Bending, Lifting and Sleeping.  3) Clinical presentation characteristics are:   Stable/Uncomplicated.  4) Decision-Making    Low complexity using standardized  patient assessment instrument and/or measureable assessment of functional outcome.  Cumulative Therapy Evaluation is: Low complexity.    Previous and current functional limitations:  (See Goal Flow Sheet for this information)    Short term and Long term goals: (See Goal Flow Sheet for this information)     Communication ability:  Patient appears to be able to clearly communicate and understand verbal and written communication and follow directions correctly.  Treatment Explanation - The following has been discussed with the patient:   RX ordered/plan of care  Anticipated outcomes  Possible risks and side effects  This patient would benefit from PT intervention to resume normal activities.   Rehab potential is good.    Frequency:  1 X week, once daily  Duration:  for 6 weeks  Discharge Plan:  Achieve all LTG.  Independent in home treatment program.  Reach maximal therapeutic benefit.    Please refer to the daily flowsheet for treatment today, total treatment time and time spent performing 1:1 timed codes.

## 2018-08-20 NOTE — MR AVS SNAPSHOT
After Visit Summary   2018    Imelda Zepeda    MRN: [id number]           Patient Information     Date Of Birth          1985        Visit Information        Provider Department      2018 3:20 PM Phuong Medina PT [facility] Physical [facility]        Today's Diagnoses     S/P  section    -  1       Follow-ups after your visit        Your next 10 appointments already scheduled     Aug 29, 2018  2:00 PM CDT   BRYAN For Women Only with Phuong Medina PT   [facility] Physical Therapy ([facility]  )    [address] [place] [id number]   [phone]            Sep 12, 2018 10:50 AM CDT   BRYAN For Women Only with Phuong Medina PT   [facility] Physical Therapy ([facility]  )    [address] [place] [id number] [phone]              Who to contact     If you have questions or need follow up information about today's clinic visit or your schedule please contact [facility] PHYSICAL THERAPY directly at [phone].  Normal or non-critical lab and imaging results will be communicated to you by WirelessGatehart, letter or phone within 4 business days after the clinic has received the results. If you do not hear from us within 7 days, please contact the clinic through WirelessGatehart or phone. If you have a critical or abnormal lab result, we will notify you by phone as soon as possible.  Submit refill requests through [employer] or call your pharmacy and they will forward the refill request to us. Please allow 3 business days for your refill to be completed.          Additional Information About Your Visit        MyChart Information     [employer] gives you secure access to your electronic health record. If you see a primary care provider, you can also send messages to your care team and make appointments. If you have questions, please  call your primary care clinic.  If you do not have a primary care provider, please call [phone] and they will assist you.        Care EveryWhere ID     This is your Care EveryWhere ID. This could be used by other organizations to access your [place] medical records  [id number]         Blood Pressure from Last 3 Encounters:   06/09/18 118/82   10/19/14 125/75    Weight from Last 3 Encounters:   06/09/18 61 kg (134 lb 7.7 oz)              We Performed the Following     HC PT EVAL, LOW COMPLEXITY     BRYAN INITIAL EVAL REPORT     MANUAL THER TECH,1+REGIONS,EA 15 MIN     NEUROMUSCULAR RE-EDUCATION        Primary Care Provider Office Phone # Fax #    Angel Kirk PA-C [phone]       [facility] [address] [place] [address]        Equal Access to Services     [facility] : Hadii aad ku hadasho Soomaali, waaxda luqadaha, qaybta kaalmada adeegyada, waxay tyrain hayaan shira mccann . So [facility] [phone].    ATENCIÓN: Si habla español, tiene a quevedo disposición servicios gratuitos de asistencia lingüística. [facility] [phone].    We comply with applicable federal civil rights laws and [place] laws. We do not discriminate on the basis of race, color, national origin, age, disability, sex, sexual orientation, or gender identity.            Thank you!     Thank you for choosing [facility] PHYSICAL THERAP[facility]  for your care. Our goal is always to provide you with excellent care. Hearing back from our patients is one way we can continue to improve our services. Please take a few minutes to complete the written survey that you may receive in the mail after your visit with us. Thank you!             Your Updated Medication List - Protect others around you: Learn how to safely use, store and throw away your medicines at www.disposemymeds.org.          This list is accurate as of 8/20/18  4:38 PM.  Always use your most recent med list.                    Brand Name Dispense Instructions for use Diagnosis    calcium carbonate 500 MG chewable tablet    TUMS     Take 2 chew tab by mouth 2 times daily        HYDROmorphone 2 MG tablet    DILAUDID    20 tablet    Take 1 tablet (2 mg) by mouth every 3 hours as needed for moderate to severe pain    S/P  section       labetalol 200 MG tablet    NORMODYNE    60 tablet    Take 1 tablet (200 mg) by mouth every 12 hours    Pre-eclampsia, antepartum       NIFEdipine ER 30 MG Tb24    ADALAT CC    30 tablet    Take 1 tablet (30 mg) by mouth daily    Pre-eclampsia, antepartum       prenatal multivitamin plus iron 27-0.8 MG Tabs per tablet      Take 1 tablet by mouth daily        PROZAC PO      Take 10 mg by mouth daily

## 2018-08-29 ENCOUNTER — THERAPY VISIT (OUTPATIENT)
Dept: PHYSICAL THERAPY | Facility: CLINIC | Age: 33
End: 2018-08-29
Payer: COMMERCIAL

## 2018-08-29 DIAGNOSIS — Z98.891 S/P CESAREAN SECTION: ICD-10-CM

## 2018-08-29 PROCEDURE — 97140 MANUAL THERAPY 1/> REGIONS: CPT | Mod: GP | Performed by: PHYSICAL THERAPIST

## 2018-08-29 PROCEDURE — 97110 THERAPEUTIC EXERCISES: CPT | Mod: GP | Performed by: PHYSICAL THERAPIST

## 2018-08-29 PROCEDURE — 97112 NEUROMUSCULAR REEDUCATION: CPT | Mod: GP | Performed by: PHYSICAL THERAPIST

## 2018-09-24 ENCOUNTER — THERAPY VISIT (OUTPATIENT)
Dept: PHYSICAL THERAPY | Facility: CLINIC | Age: 33
End: 2018-09-24
Payer: COMMERCIAL

## 2018-09-24 DIAGNOSIS — Z98.891 S/P CESAREAN SECTION: ICD-10-CM

## 2018-09-24 PROCEDURE — 97112 NEUROMUSCULAR REEDUCATION: CPT | Mod: GP | Performed by: PHYSICAL THERAPIST

## 2018-09-24 PROCEDURE — 97110 THERAPEUTIC EXERCISES: CPT | Mod: GP | Performed by: PHYSICAL THERAPIST

## 2018-09-24 PROCEDURE — 97140 MANUAL THERAPY 1/> REGIONS: CPT | Mod: GP | Performed by: PHYSICAL THERAPIST

## 2018-10-22 ENCOUNTER — THERAPY VISIT (OUTPATIENT)
Dept: PHYSICAL THERAPY | Facility: CLINIC | Age: 33
End: 2018-10-22
Payer: COMMERCIAL

## 2018-10-22 DIAGNOSIS — Z98.891 S/P CESAREAN SECTION: ICD-10-CM

## 2018-10-22 PROCEDURE — 97140 MANUAL THERAPY 1/> REGIONS: CPT | Mod: GP | Performed by: PHYSICAL THERAPIST

## 2018-10-22 PROCEDURE — 97110 THERAPEUTIC EXERCISES: CPT | Mod: GP | Performed by: PHYSICAL THERAPIST

## 2018-10-22 NOTE — PROGRESS NOTES
[facility]  System  Physical Exam  General   ROS    DISCHARGE REPORT    Progress reporting period is from 8/20/2018 to 10/22/2018.       SUBJECTIVE  Subjective: Pt reports that she feels really good. She is feeling much stronger. She is much less sensitive over her incision.     Current pain level is 0/10  .     Previous pain level was  0/10  .   Changes in function:  Yes (See Goal flowsheet attached for changes in current functional level)  Adverse reaction to treatment or activity: None    OBJECTIVE  Changes noted in objective findings:  Yes, improved pain over incision, improved core strength  Objective: Reviewed and progressed HEP as appropriate. Pt has met all goals and ready for discharge this date.      ASSESSMENT/PLAN    STG/LTGs have been met or progress has been made towards goals:  Yes (See Goal flow sheet completed today.)  Assessment of Progress: The patient's condition is improving.  Self Management Plans:  Patient has been instructed in a home treatment program.  Imelda continues to require the following intervention to meet STG and LTG's:  PT intervention is no longer required to meet STG/LTG.    Recommendations:  This patient is ready to be discharged from therapy and continue their home treatment program.    Please refer to the daily flowsheet for treatment today, total treatment time and time spent performing 1:1 timed codes.

## 2018-10-22 NOTE — MR AVS SNAPSHOT
After Visit Summary   10/22/2018    Imelda Zepeda    MRN: [id number]           Patient Information     Date Of Birth          1985        Visit Information        Provider Department      10/22/2018 3:20 PM Phuong Medina PT [facility] Athletic [facility]        Today's Diagnoses     S/P  section           Follow-ups after your visit        Who to contact     If you have questions or need follow up information about today's clinic visit or your schedule please contact [facility]THLETIC [facility] PHYSICAL [facility] directly at [phone].  Normal or non-critical lab and imaging results will be communicated to you by [facility] Grouphart, letter or phone within 4 business days after the clinic has received the results. If you do not hear from us within 7 days, please contact the clinic through mySocietyt or phone. If you have a critical or abnormal lab result, we will notify you by phone as soon as possible.  Submit refill requests through [employer] or call your pharmacy and they will forward the refill request to us. Please allow 3 business days for your refill to be completed.          Additional Information About Your Visit        MyChart Information     [employer] gives you secure access to your electronic health record. If you see a primary care provider, you can also send messages to your care team and make appointments. If you have questions, please call your primary care clinic.  If you do not have a primary care provider, please call [phone] and they will assist you.        Care EveryWhere ID     This is your Care EveryWhere ID. This could be used by other organizations to access your [place] medical records  [id number]         Blood Pressure from Last 3 Encounters:   18 118/82   10/19/14 125/75    Weight from Last 3 Encounters:   18 61 kg (134 lb 7.7 oz)              We Performed the Following     BRYAN PROGRESS NOTES REPORT      MANUAL THER TECH,1+REGIONS,EA 15 MIN     THERAPEUTIC EXERCISES        Primary Care Provider Office Phone # Fax #    Angel Kirk PA-C [phone]       [facility]  [address] [address]        Equal Access to Services     [facility]: Hadii aad ku hadasho Soomaali, waaxda luqadaha, qaybta kaalmada adeegyada, waxay idiin hayaan adeeg khluis antonio laconnern ah. So [place] -[phone].    ATENCIÓN: Si habla español, tiene a quevedo disposición servicios gratuitos de asistencia lingüística. Llame al [phone].    We comply with applicable federal civil rights laws and [place] laws. We do not discriminate on the basis of race, color, national origin, age, disability, sex, sexual orientation, or gender identity.            Thank you!     Thank you for choosing [place] [facility] PHYSICAL THERAP[facility]  for your care. Our goal is always to provide you with excellent care. Hearing back from our patients is one way we can continue to improve our services. Please take a few minutes to complete the written survey that you may receive in the mail after your visit with us. Thank you!             Your Updated Medication List - Protect others around you: Learn how to safely use, store and throw away your medicines at www.disposemymeds.org.          This list is accurate as of 10/22/18  4:38 PM.  Always use your most recent med list.                   Brand Name Dispense Instructions for use Diagnosis    calcium carbonate 500 MG chewable tablet    TUMS     Take 2 chew tab by mouth 2 times daily        HYDROmorphone 2 MG tablet    DILAUDID    20 tablet    Take 1 tablet (2 mg) by mouth every 3 hours as needed for moderate to severe pain    S/P  section       labetalol 200 MG tablet    NORMODYNE    60 tablet    Take 1 tablet (200 mg) by mouth every 12 hours    Pre-eclampsia, antepartum       NIFEdipine ER 30 MG Tb24    ADALAT CC    30 tablet    Take 1 tablet (30 mg) by mouth daily     Pre-eclampsia, antepartum       prenatal multivitamin plus iron 27-0.8 MG Tabs per tablet      Take 1 tablet by mouth daily        PROZAC PO      Take 10 mg by mouth daily

## 2018-11-28 ENCOUNTER — TELEPHONE (OUTPATIENT)
Dept: CONSULT | Facility: CLINIC | Age: 33
End: 2018-11-28

## 2018-11-28 DIAGNOSIS — Z84.81 FAMILY HISTORY OF CARRIER OF GENETIC DISEASE: Primary | ICD-10-CM

## 2018-11-28 NOTE — TELEPHONE ENCOUNTER
Imelda e-mailed me and requested we place a renal ultrasound order. This was recommended by Dr. Bhagat at her daughter, Jodi's last genetics appointment. 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    Imelda has checked with insurance, and renal ultrasound is a covered benefit, so she would like to proceed.     Orders placed, she will schedule to have done at [facility].

## 2018-11-30 ENCOUNTER — HOSPITAL ENCOUNTER (OUTPATIENT)
Dept: ULTRASOUND IMAGING | Facility: CLINIC | Age: 33
Discharge: HOME | End: 2018-11-30
Attending: MEDICAL GENETICS | Admitting: MEDICAL GENETICS
Payer: COMMERCIAL

## 2018-11-30 DIAGNOSIS — Z84.81 FAMILY HISTORY OF CARRIER OF GENETIC DISEASE: ICD-10-CM

## 2018-11-30 PROCEDURE — 76770 US EXAM ABDO BACK WALL COMP: CPT

## 2020-03-02 ENCOUNTER — HEALTH MAINTENANCE LETTER (OUTPATIENT)
Age: 35
End: 2020-03-02

## 2020-12-20 ENCOUNTER — HEALTH MAINTENANCE LETTER (OUTPATIENT)
Age: 35
End: 2020-12-20

## 2021-04-24 ENCOUNTER — HEALTH MAINTENANCE LETTER (OUTPATIENT)
Age: 36
End: 2021-04-24

## 2021-10-03 ENCOUNTER — HEALTH MAINTENANCE LETTER (OUTPATIENT)
Age: 36
End: 2021-10-03

## 2022-05-15 ENCOUNTER — HEALTH MAINTENANCE LETTER (OUTPATIENT)
Age: 37
End: 2022-05-15

## 2022-09-10 ENCOUNTER — HEALTH MAINTENANCE LETTER (OUTPATIENT)
Age: 37
End: 2022-09-10

## 2023-06-03 ENCOUNTER — HEALTH MAINTENANCE LETTER (OUTPATIENT)
Age: 38
End: 2023-06-03

## 2024-05-09 NOTE — BRIEF OP NOTE
[facility] Brief Operative Note    Pre-operative diagnosis: 37 week gestation, fetal intolerence to labor, mild pre-eclampsia, IUGR   Post-operative diagnosis Same   Procedure: Procedure(s):   - Wound Class: II-Clean Contaminated   Primary low transverse  section   Surgeon(s): Surgeon(s) and Role:     * Tory Rivera MD - Primary   Estimated blood loss: 500 cc   Specimens: None   Findings: Liveborn female infant, 5#8, 8/9 Apgars          The patient's goals for the shift include Maintain pt safety    The clinical goals for the shift include Pt will have a BM by the end of this shift.    Over the shift, the patient did make progress toward the following goals. Pt has been having loose stools and RN held her laxative at this time. Pt's heparin drip stopped and Xarelto started today. Awaiting discharge to SNF when medically ready.

## (undated) DEVICE — GLOVE PROTEXIS W/NEU-THERA 6.5  2D73TE65

## (undated) DEVICE — LINEN C-SECTION 5415

## (undated) DEVICE — BLADE CLIPPER 4406

## (undated) DEVICE — SU VICRYL 0 CT-1 36" J346H

## (undated) DEVICE — CATH TRAY FOLEY 16FR BARDEX W/DRAIN BAG STATLOCK 300316A

## (undated) DEVICE — SUCTION CANISTER MEDIVAC LINER 3000ML W/LID 65651-530

## (undated) DEVICE — PACK C-SECTION LF PL15OTA83B

## (undated) DEVICE — SU VICRYL 0 CT-1 27" J340H

## (undated) DEVICE — SOL NACL 0.9% IRRIG 1000ML BOTTLE 07138-09

## (undated) DEVICE — PREP CHLORAPREP 26ML TINTED ORANGE  260815

## (undated) DEVICE — ESU GROUND PAD UNIVERSAL W/O CORD